# Patient Record
Sex: FEMALE | Race: WHITE | Employment: OTHER | URBAN - METROPOLITAN AREA
[De-identification: names, ages, dates, MRNs, and addresses within clinical notes are randomized per-mention and may not be internally consistent; named-entity substitution may affect disease eponyms.]

---

## 2017-10-20 NOTE — PATIENT DISCUSSION
Recommended artificial tears to use: 1 drop 8x a day in lefteyes. Refresh Optive. Retaine sample given.

## 2017-11-17 NOTE — PATIENT DISCUSSION
Discussed treatment options, including: laser, PPV, and observation. Recommend PPV. Discussed at length risks and benefits of vitrectomy.

## 2018-05-08 ENCOUNTER — OFFICE VISIT (OUTPATIENT)
Dept: PAIN MANAGEMENT | Age: 63
End: 2018-05-08

## 2018-05-08 VITALS
SYSTOLIC BLOOD PRESSURE: 127 MMHG | DIASTOLIC BLOOD PRESSURE: 80 MMHG | RESPIRATION RATE: 14 BRPM | HEIGHT: 64 IN | BODY MASS INDEX: 22.2 KG/M2 | TEMPERATURE: 98.8 F | HEART RATE: 84 BPM | WEIGHT: 130 LBS

## 2018-05-08 DIAGNOSIS — M25.572 CHRONIC PAIN OF LEFT ANKLE: Primary | ICD-10-CM

## 2018-05-08 DIAGNOSIS — G89.29 CHRONIC PAIN OF LEFT ANKLE: Primary | ICD-10-CM

## 2018-05-08 DIAGNOSIS — G89.4 CHRONIC PAIN SYNDROME: ICD-10-CM

## 2018-05-08 DIAGNOSIS — M79.672 LEFT FOOT PAIN: ICD-10-CM

## 2018-05-08 DIAGNOSIS — M19.072 PRIMARY OSTEOARTHRITIS OF LEFT ANKLE: ICD-10-CM

## 2018-05-08 RX ORDER — PSEUDOEPHEDRINE HCL 30 MG
TABLET ORAL
COMMUNITY
End: 2020-03-09 | Stop reason: CLARIF

## 2018-05-08 RX ORDER — ZINC GLUCONATE 50 MG
1000 TABLET ORAL DAILY
COMMUNITY
End: 2020-03-09 | Stop reason: CLARIF

## 2018-05-08 RX ORDER — TRAMADOL HYDROCHLORIDE 50 MG/1
50 TABLET ORAL
COMMUNITY
End: 2020-03-09 | Stop reason: CLARIF

## 2018-05-08 RX ORDER — HYDROCODONE BITARTRATE AND ACETAMINOPHEN 7.5; 325 MG/1; MG/1
TABLET ORAL
COMMUNITY
End: 2020-03-09 | Stop reason: CLARIF

## 2018-05-08 RX ORDER — VENLAFAXINE HYDROCHLORIDE 75 MG/1
CAPSULE, EXTENDED RELEASE ORAL DAILY
COMMUNITY
End: 2020-03-09 | Stop reason: CLARIF

## 2018-05-08 NOTE — PROGRESS NOTES
The pt is here today to discuss procedure options with Dr. Jennifer Washburn. Pt rates pain as 8/10.

## 2018-05-09 PROBLEM — G89.29 CHRONIC PAIN OF LEFT ANKLE: Status: ACTIVE | Noted: 2018-05-09

## 2018-05-09 PROBLEM — M79.672 LEFT FOOT PAIN: Status: ACTIVE | Noted: 2018-05-09

## 2018-05-09 PROBLEM — M19.072 PRIMARY OSTEOARTHRITIS OF LEFT ANKLE: Status: ACTIVE | Noted: 2018-05-09

## 2018-05-09 PROBLEM — M25.572 CHRONIC PAIN OF LEFT ANKLE: Status: ACTIVE | Noted: 2018-05-09

## 2018-05-09 PROBLEM — G89.4 CHRONIC PAIN SYNDROME: Status: ACTIVE | Noted: 2018-05-09

## 2018-05-09 NOTE — PROGRESS NOTES
[address] for Pain Management  Interventional Pain Management Consultation History & Physical    PATIENT NAME:  Ti Pantoja     YOB: 1955    DATE OF SERVICE:   5/8/2018      CHIEF COMPLAINT:  Foot Pain (left)      REASON FOR VISIT:   Ti Pantoja presents to the pain clinic today for initial evaluation and to consider interventional pain management options as indicated for the type and location of the pain the patient is presenting with. HISTORY OF PRESENT ILLNESS:   Patient presents for initial evaluation and consideration for interventional procedures as indicated. She is referred to us by Dr. Kimani Rivera for assistance with management of the patient's left foot and left ankle pain. At today's evaluation patient endorses left foot, left ankle pain for 3 years duration. She denies any specific antecedent trauma injury or accident. She endorses insidious onset to her left foot and left ankle pain. Approximately 2 years ago she underwent surgical procedure of the left ankle. She states that this helped her somewhat but she is still an excruciating left foot and left ankle pain. She endorses aching shooting sharp pain of her left foot and left ankle especially with ambulation. She is tried different medications with little benefit. Medications include opioid and non-opioid management preparations. She is tried neuropathic agents as well as nonsteroidals, with little benefit. She wears a orthotic device to help immobilize and stabilize her left foot. She endorses numbness dorsal aspect of her left foot down as far as the toes. She had some sort of injection distal aspect of her left foot with some benefit for a while     By review of available medical records, progress note dated March 1, 2018 by Dr. Francie Sharma is reviewed. Patient having pain up to 10/10 in intensity.   She had been terminated from pain management for violation of the opioid agreement. She apparently tested positive on urinalysis for medication oxycodone, apparently she has not been prescribed this. She is given 40 tablets of hydrocodone with no refills. She was referred to pain management specialist.  As above, bridge bolus of hydrocodone was provided. It appears as though patient will need a triple arthrodesis. She needs to stop smoking before her surgeons will consider this procedure. Custom ankle-foot orthosis is provided via clinic evaluation April 20, 2017. MRI left ankle is obtained, October 22, 2014. Moderate distal Achilles tendinosis. 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 Moderate degenerative changes subchondral edema. Diffuse marrow edema and cystic change in the anterior medial talus. ASSESSMENT/OPTIONS: as follows. We discussed options. Patient has very long-standing history of chronic left ankle pain. She has had one previous surgical procedure that apparently did not help with her pain and left ankle issues. She has been reevaluated by Dr. Heaven Nolan, and the feeling is that she needs a triple arthrodesis. Surgery is postponed until patient has stopped smoking. I believe I can help patient with intra-articular ultrasound-guided injection local anesthetic and steroid. This would of course very likely be a temporizing measure, until the patient gets definitive surgery. I have discussed the risks and benefits, indications, contraindications, and side effects of intended procedure with the patient. I have used skeleton spine model to describe and discuss the procedure with the patient. I have answered all questions relating to the procedure. Patient understands the nature of the procedure and wishes to proceed. Patient has no further questions. MRI Results (most recent):    Results from [place] encounter on 10/22/14   MRI ANKLE LT WO CONT   Narrative MRI  left ankle.     HISTORY: Pain and swelling. TECHNIQUE: MRI of the ankle was obtained without the administration of IV  contrast utilizing sagittal, coronal and axial T1 and T2 fat-saturated and  sagittal STIR sequences. COMPARISON: None. Findings:  Distal Achilles tendon is moderately thickened and increased in signal with soft  tissue edema posteriorly, likely representing tendinosis. No tear is identified. Trace retrocalcaneal bursal fluid. Diffuse marrow edema and cystic change in the plantar aspect of the medial talus  near the sinus Tarsi small amount of subchondral cystic change and edema at the  floor of the sinus Tarsi. Moderate degenerative changes at the talonavicular  joint with mild surrounding subchondral edema image 10 sagittal T1 suggestive of  fibrous coalition. Mild Degenerative changes of the second through fourth  tarsometatarsal joints    The flexor tendons, include the posterior tibialis, flexor digitorum longus, and  flexor hallucis longus are unremarkable. No tenosynovitis, tendinosis, or tear. Peroneus longus and brevis tendons are unremarkable. Extensor tendons, to include the tibialis anterior, extensor hallucis longus,  and extensor digitorum longus are normal.    The medial ankle ligament complex (deltoid) is intact, including the tibiotalar  component, the tibiocalcaneal component, and the spring ligament. The lateral ankle ligaments are intact, to include the anterior and posterior  tibiofibular ligaments, the anterior and posterior talofibular ligaments, and  the calcaneofibular ligament. Sinus tarsi demonstrates normal signal intensity. Mild edema around the calcaneal insertion of the central band of the plantar  fascia, may represent mild plantar fasciitis. Central band may be slightly  increased in signal, is not thickened. The tarsal tunnel appears normal.  Soft tissue edema around the ankle           Impression IMPRESSION:    Moderate distal Achilles tendinosis.     Fibrous coalition of the calcaneonavicular joint. Moderate degenerative changes  and subchondral edema are noted at the articulation. Diffuse marrow edema and  cystic change in the anterior medial talus may also be related. Thank you for this referral.              PAST MEDICAL HISTORY:   The patient  has no past medical history on file. PAST SURGICAL HISTORY:   The patient  has no past surgical history on file. CURRENT MEDICATIONS:   The patient has a current medication list which includes the following prescription(s): venlafaxine-sr, hydrocodone-acetaminophen, tramadol, cetirizine, cyanocobalamin, and pseudoephedrine. ALLERGIES:   Allergies not on file    FAMILY HISTORY:   The patient family history is not on file. SOCIAL HISTORY:   The patient  reports that she has been smoking. She does not have any smokeless tobacco history on file. The patient  has no alcohol history on file. She      REVIEW OF SYSTEMS:    The patient denies fever, chills, weight loss (Constitutional), rash, itching (Skin), tinnitus, congestion (HENT), blurred vision, photophobia (Eyes), palpitations, orthopnea (Cardiovascular), hemoptysis, wheezing (Respiratory), nausea, vomiting, diarrhea (Gastrointestinal), dysuria, hematuria, urgency (Genitourinary), bowel or bladder incontinence, loss of consciousness (Neurologic), suicidal or homicidal ideation or hallucinations (Psychiatric). Denies swelling, axillary or groin masses (Lymphatic). PHYSICAL EXAM:  VS:   Visit Vitals    /80 (BP 1 Location: Left arm, BP Patient Position: Sitting)    Pulse 84    Temp 98.8 °F (37.1 °C) (Oral)    Resp 14    Ht 5' 4\" (1.626 m)    Wt 59 kg (130 lb)    BMI 22.31 kg/m2     General: Well-developed and well-nourished. Body habitus consistent with recorded height and weight and the calculated BMI. Apparent distress due to left ankle pain. Head: Normocephalic, atraumatic.   Skin: Inspection of the skin reveals no rashes, lesions or infection. CV: Regular rate. No murmurs or rubs noted. No peripheral edema noted. Pulm: Respirations are even and unlabored. Extr: No clubbing, cyanosis, or edema noted. Musculoskeletal:  1. Cervical spine  Full ROM. No paraspinous tenderness at any level. There is no scoliosis, asymmetry, or musculoskeletal defect. 2. Thoracic spine  Full ROM. No paraspinous tenderness at any level. There is no scoliosis, asymmetry, or musculoskeletal defect. 3. Lumbar spine  Full ROM. No paraspinous tenderness at any level. SI joints are nontender bilaterally. There is no scoliosis, asymmetry, or musculoskeletal defect. 4. Right upper extremity  Full ROM. 5/5 muscle strength in all muscle groups. No pain or tenderness in shoulder, elbow, wrist, or hand. 5. Left upper extremity  Full ROM. 5/5 muscle strength in all muscle groups. No pain or tenderness in shoulder, elbow, wrist, or hand. 6. Right lower extremity  Full ROM. 5/5 muscle strength in all muscle groups. No pain, tenderness, or swelling in the hip, knee, ankle or foot. 7. Left lower extremity  Full ROM. 5/5 muscle strength in all muscle groups. Pain, tenderness palpation, crepitus, mild effusion noted at the distal fibular tubular/ankle joint. No induration, erythema, exudate otherwise noted skin otherwise warm and dry, pulses intact. Mildly hyperalgesic proximal ankle and dorsal foot. Gait on the left foot with weightbearing is guarded. Good capillary refill. Neurological:  1. Mental Status - Alert, awake and oriented. Speech is clear and appropriate. 2. Cranial Nerves - Extraocular muscles intact bilaterally. Cranial nerves II-XII grossly intact bilaterally. 3. Gait - antalgic   4. Reflexes - 2+ and symmetric throughout. 5. Sensation - Intact to light touch and pin prick. 6. Provocative Tests - Spurlings negative bilaterally. Straight leg raise negative bilaterally. Psychological:  1.  Mood and affect  Appropriate. 2. Speech  Appropriate. 3. Though content  Appropriate. 4. Judgment  Appropriate. ASSESSMENT:      ICD-10-CM ICD-9-CM    1. Chronic pain of left ankle M25.572 719.47     G89.29 338.29    2. Left foot pain M79.672 729.5    3. Primary osteoarthritis of left ankle M19.072 715.17    4. Chronic pain syndrome G89.4 338.4            PLAN:    1.    I have thoroughly discussed the risks and benefits, indications, contraindications, and side effects of any and procedures that were mentioned at today's patient visit. I have used a skeleton model to explain all procedures, as well as to provide added emphasis regarding procedures and as well for patient education purposes. I have answered all questions in great detail, and I have obtained verbal confirmation for all procedures planned with the patient. 3.    I have reviewed in great detail today, when indicated, the patient's MRI and other imaging studies with the patient. I have explained to the patient, when indicated, their condition using both actual recent and relevant images insofar as I am able to obtain actual images. I have used a skeleton model for added emphasis as well as patient education. 4.    I have advised patient to have a primary care provider continue to care for their health maintenance and general medical conditions. 5,    I have placed appropriate referrals to specialty care providers as I have deemed necessary through today's clinical consultation with the patient. 5.    I have explained to the patient that if any significant side effects, issues, problems, concerns, or perceived complications may arise at around the time of the patient's procedures, they should either call the pain management clinic or go to the emergency room immediately for medical provider evaluation.    6.   I have encouraged all patients to call the pain management clinic with any questions or concerns that they may have pertaining to their procedures. DISPOSITION:   The patients condition and plan were discussed at length and all questions were answered. The patient agrees with the plan. A total of 45 minutes was spent with the patient of which over half of the time was spent counseling the patient. Brooklyn Zaman MD 5/9/2018 1:04 PM    Note: Although these clinic notes were documented by the provider at the time of the exam, they have not been proofed and are subject to transcription variance.

## 2018-05-14 RX ORDER — SODIUM CHLORIDE 0.9 % (FLUSH) 0.9 %
5-10 SYRINGE (ML) INJECTION AS NEEDED
Status: CANCELLED | OUTPATIENT
Start: 2018-05-14

## 2018-05-14 RX ORDER — DIAZEPAM 5 MG/1
10 TABLET ORAL ONCE
Status: CANCELLED | OUTPATIENT
Start: 2018-05-16 | End: 2018-05-16

## 2018-05-16 ENCOUNTER — HOSPITAL ENCOUNTER (OUTPATIENT)
Age: 63
Setting detail: OUTPATIENT SURGERY
Discharge: HOME | End: 2018-05-16
Attending: PHYSICAL MEDICINE & REHABILITATION | Admitting: PHYSICAL MEDICINE & REHABILITATION
Payer: COMMERCIAL

## 2018-05-16 ENCOUNTER — APPOINTMENT (OUTPATIENT)
Dept: GENERAL RADIOLOGY | Age: 63
End: 2018-05-16
Attending: PHYSICAL MEDICINE & REHABILITATION
Payer: COMMERCIAL

## 2018-05-16 VITALS
SYSTOLIC BLOOD PRESSURE: 113 MMHG | HEART RATE: 77 BPM | OXYGEN SATURATION: 97 % | DIASTOLIC BLOOD PRESSURE: 50 MMHG | RESPIRATION RATE: 20 BRPM | TEMPERATURE: 98.9 F | HEIGHT: 64 IN | BODY MASS INDEX: 22.2 KG/M2 | WEIGHT: 130 LBS

## 2018-05-16 PROCEDURE — 77030003666 HC NDL SPINAL BD -A: Performed by: PHYSICAL MEDICINE & REHABILITATION

## 2018-05-16 PROCEDURE — 74011250636 HC RX REV CODE- 250/636: Performed by: PHYSICAL MEDICINE & REHABILITATION

## 2018-05-16 PROCEDURE — 74011000250 HC RX REV CODE- 250

## 2018-05-16 PROCEDURE — 76010000009 HC PAIN MGT 0 TO 30 MIN PROC: Performed by: PHYSICAL MEDICINE & REHABILITATION

## 2018-05-16 PROCEDURE — 74011250637 HC RX REV CODE- 250/637: Performed by: PHYSICAL MEDICINE & REHABILITATION

## 2018-05-16 PROCEDURE — 74011250636 HC RX REV CODE- 250/636

## 2018-05-16 RX ORDER — TRIAMCINOLONE ACETONIDE 40 MG/ML
INJECTION, SUSPENSION INTRA-ARTICULAR; INTRAMUSCULAR AS NEEDED
Status: DISCONTINUED | OUTPATIENT
Start: 2018-05-16 | End: 2018-05-16 | Stop reason: HOSPADM

## 2018-05-16 RX ORDER — DIAZEPAM 5 MG/1
10 TABLET ORAL ONCE
Status: DISCONTINUED | OUTPATIENT
Start: 2018-05-16 | End: 2018-05-16

## 2018-05-16 RX ORDER — DIAZEPAM 5 MG/1
5 TABLET ORAL ONCE
Status: COMPLETED | OUTPATIENT
Start: 2018-05-16 | End: 2018-05-16

## 2018-05-16 RX ORDER — SODIUM CHLORIDE 0.9 % (FLUSH) 0.9 %
5-10 SYRINGE (ML) INJECTION AS NEEDED
Status: DISCONTINUED | OUTPATIENT
Start: 2018-05-16 | End: 2018-05-16 | Stop reason: HOSPADM

## 2018-05-16 RX ORDER — LIDOCAINE HYDROCHLORIDE 10 MG/ML
INJECTION, SOLUTION EPIDURAL; INFILTRATION; INTRACAUDAL; PERINEURAL AS NEEDED
Status: DISCONTINUED | OUTPATIENT
Start: 2018-05-16 | End: 2018-05-16 | Stop reason: HOSPADM

## 2018-05-16 RX ADMIN — DIAZEPAM 5 MG: 5 TABLET ORAL at 08:50

## 2018-05-16 NOTE — PROCEDURES
THE [facility] FOR PAIN MANAGEMENT     INTRA ARTICULAR ANKLE INJECTION  PROCEDURE REPORT      PATIENT:  Troy Aguilar  YOB: 1955  DATE OF SERVICE:  5/16/2018  [facility]    PRE-PROCEDURE DIAGNOSIS:  See Above    POST-PROCEDURE DIAGNOSIS:  See Above                PROCEDURE:    1. Left intra-articular ankle injection (20605)  2. Fluoroscopic needle guidance (non-spinal) (13270)    ANESTHESIA:   Local with oral sedation. See Medication Administration Record for specific medications and dosage. COMPLICATIONS: None. PHYSICIAN:  Onel Villa MD    PRE-PROCEDURE NOTE:  Pre-procedural assessment of the patient was performed including a limited history and physical examination. The details of the procedure were discussed with the patient, including the risks, benefits and alternative options and an informed consent was obtained. The patients NPO status, if necessary for the specific procedure and/or administration of moderate intravenous sedation, if utilized, and availability of a responsible adult to escort the patient following the procedure were confirmed. PROCEDURE NOTE:  The patient was brought to the procedure suite and positioned on the fluoroscopy table in the supine position. Physiologic monitors were applied and supplemental oxygen was administered via nasal cannula. The skin was prepped in the standard surgical fashion and sterile drapes were applied over the procedure site. Please refer to the Flowsheet for documentation of the patients vital signs and the Medication Administration Record for any oral and/or intravenous sedation administered prior to or during the procedure. 1% lidocaine was utilized for local anesthesia. Under AP fluoroscopic guidance, the distal joint of the left ankle was identified.   A 1.5-inch, 25-gauge short bevel spinal needle was advanced perpendicular to the skin into the distal ankle joint complex. After negative aspiration of blood, 1-2 ml of a nonionic, water soluble, radiographic contrast medium (Isovue-M 200) was injected demonstrating appropriate proximal spread of contrast within the joint capsule. Following this, 10 ml of solution comprised of 2ml of triamcinolone (40 mg/ml) and 8ml of lidocaine 1% was injected slowly after negative aspiration of blood. The needle was cleared of steroid solution and removed. The area was thoroughly cleaned and sterile bandages applied as necessary. The patient tolerated the procedure well and vital signs remained stable throughout the procedure. POST-PROCEDURE COURSE:   The patient was escorted from the procedure suite in satisfactory condition and recovered per facility protocol based on the type of procedure performed and/or the sedation utilized. The patient did not experience any adverse events and remained hemodynamically stable during the post-procedure period. DISCHARGE NOTE:  Upon discharge, the patient was able to tolerate fluids and was in no acute distress. The patient was oriented to person, place and time and vital signs were stable. Appropriate post-procedure instructions were provided and explained to the patient in detail and all questions were answered.     Aparna Bowen MD 5/16/2018 5:52 PM

## 2018-05-16 NOTE — DISCHARGE INSTRUCTIONS
[address] [place] [address] for Pain Management      Post Procedures Instructions    *Resume Diet and Activity as tolerated. Rest for the remainder of the day. *You may fell worse before you feel better as the numbing medications wear off before the steroids take effect if used for your procedures. *Do not use affected extremity until numbness or loss of sensation has completely resolved without assistance. *DO NOT DRIVE, operate machinery/heavey equipment for 24 hours. *DO NOT DRINK ALCOHOL for 24 hours as it may interact with the sedation if you received it and also thins your blood and may cause you to bleed. *WAIT 24 hours before starting back ANY Blood thinning medications:   (Heparin, Coumadin, Warfarin, Lovenox, Plavix, Aggrenox)    *Resume Pre-Procedure Medications as prescribed except Blood Thinners unless directed by your Physician or Cardiologist.     *Avoid Hot tubs and Heating pad for 24 hours to prevent dissipation of medications, you may shower to remove bandages and remaining prep residue on the skin. * If you develop a Headache, drink plenty of fluids including beverages with caffeine (Coffee, Mt. Dew etc.) and rest.  If the headache persists longer than 24 hoursor intensifies - Please call [facility]) [phone]    * If you are DIABETIC, check your blood sugar three times a day for the next three days, the steroids will increase your blood sugar. If your blood sugar is greater than 400 have someone drive you to the nearest [address]. * If you experience any of the following problems, call the [facility] [phone] between 8:00 am - 4:30pm or After Hours [phone].     Shortness of breath    Fever of 101 F or higher    Nausea / Vomiting (not normal to you)    Increasing stiffness in the neck    Weakness or numbness in the arms or legs that is not resolving    Prolonged and increasing pain > than 4 days    ANYTHING OUT of the ORDINARY TO YOU    If YOU are experiencing a severe reaction / complication that you have never had before post procedure, call 911 or go to the nearest emergency room! All patients must have a  for transportation [place] regardless if you do or do not receive sedation. DISCHARGE SUMMARY from Nurse      PATIENT INSTRUCTIONS:    After Oral  or intravenous sedation, for 24 hours or while taking prescription Narcotics:  · Limit your activities  · Do not drive and operate hazardous machinery  · Do not make important personal or business decisions  · Do  not drink alcoholic beverages  · If you have not urinated within 8 hours after discharge, please contact your surgeon on call. Report the following to your surgeon:  · Excessive pain, swelling, redness or odor of or around the surgical area  · Temperature over 101  · Nausea and vomiting lasting longer than 4 hours or if unable to take medications  · Any signs of decreased circulation or nerve impairment to extremity: change in color, persistent  numbness, tingling, coldness or increase pain  · Any questions        What to do at Home:  Recommended activity: Activity as tolerated, NO DRIVING FOR 24 Hours post injection          *  Please give a list of your current medications to your Primary Care Provider. *  Please update this list whenever your medications are discontinued, doses are      changed, or new medications (including over-the-counter products) are added. *  Please carry medication information at all times in case of emergency situations. These are general instructions for a healthy lifestyle:    No smoking/ No tobacco products/ Avoid exposure to second hand smoke    Surgeon General's Warning:  Quitting smoking now greatly reduces serious risk to your health.     Obesity, smoking, and sedentary lifestyle greatly increases your risk for illness    A healthy diet, regular physical exercise & weight monitoring are important for maintaining a healthy lifestyle    You may be retaining fluid if you have a history of heart failure or if you experience any of the following symptoms:  Weight gain of 3 pounds or more overnight or 5 pounds in a week, increased swelling in our hands or feet or shortness of breath while lying flat in bed. Please call your doctor as soon as you notice any of these symptoms; do not wait until your next office visit. Recognize signs and symptoms of STROKE:    F-face looks uneven    A-arms unable to move or move unevenly    S-speech slurred or non-existent    T-time-call 911 as soon as signs and symptoms begin-DO NOT go       Back to bed or wait to see if you get better-TIME IS BRAIN.

## 2018-05-16 NOTE — INTERVAL H&P NOTE
H&P Update:  Mahad Espinoza was seen and examined. History and physical has been reviewed. The patient has been examined. There have been no significant clinical changes since the completion of the originally dated History and Physical.    [facility] for Pain Management  Brief Pre-Procedure History & Physical    PATIENT NAME:  Mahad Espinoza   YOB: 1955  DATE OF SERVICE:  5/16/2018      CHIEF COMPLAINT:  Pain    HISTORY OF PRESENT ILLNESS:  Mahad Espinoza presents today for a previously diagnosed problem contributing to some or all of this patients pain. The location and pattern of the pain has not changed substantially since the last visit in our office. No other significant medical changes have occurred in the last 30 days. PAST MEDICAL HISTORY:  The patient  has no past medical history on file. PAST SURGICAL HISTORY:  The patient  has a past surgical history that includes hx nephrectomy (Right). CURRENT MEDICATIONS:  See Medication Administration Record [facility]) in the patient's electronic record. ALLERGIES:    Allergies   Allergen Reactions    Codeine Other (comments)     palpitations    Morphine Other (comments)     Low blood pressure       FAMILY HISTORY:  The patient family history is not on file. SOCIAL HISTORY:  The patient  reports that she has been smoking. She has never used smokeless tobacco. The patient  reports that she does not drink alcohol. She  reports that she does not use illicit drugs. REVIEW OF SYSTEMS:  Mahad Espinoza denies any fever, chills, unexplained weight loss, use of antibiotics for recent infection or bleeding abnormalities. PHYSICAL EXAM:  VS:   Visit Vitals    /75 (BP 1 Location: Left arm, BP Patient Position: Sitting)    Pulse 91    Temp 98.9 °F (37.2 °C)    Resp 18    Ht 5' 4\" (1.626 m)    Wt 59 kg (130 lb)    SpO2 98%    BMI 22.31 kg/m2     Gen: Well-developed.  Body habitus consistent with recorded height and weight and the calculated BMI. No apparent distress. Head: Normocephalic, atraumatic. Skin: No obvious rashes, lesions or infection. Pulm: Respirations are even and unlabored. Psych:    Mood, affect and speech - Appropriate. ASSESSMENT:   1. Stable for left ankle  interventional pain procedure as discussed. PLAN:  Proceed with scheduled procedure.      Eliza La MD 5/16/2018 9:09 AM        Signed By: Eliza La MD     May 16, 2018 9:09 AM

## 2018-05-23 ENCOUNTER — TELEPHONE (OUTPATIENT)
Dept: PAIN MANAGEMENT | Age: 63
End: 2018-05-23

## 2018-05-23 NOTE — TELEPHONE ENCOUNTER
Ms. Janna Ardon was contacted for follow-up status post Left intra-articular ankle injection  on May 16, 2018.  She reports:    Pre-procedure numerical pain score: 7/10  Post-procedure numerical pain score immediately after: 4/10  Duration of relief post-procedure (if applicable): 1 weeks  Improvement in functional activities (if applicable): Yes  Percentage of overall improvement: 40%    COMMENTS:

## 2018-05-24 RX ORDER — DIAZEPAM 5 MG/1
10 TABLET ORAL ONCE
Status: CANCELLED | OUTPATIENT
Start: 2018-05-30 | End: 2018-05-30

## 2018-05-24 RX ORDER — SODIUM CHLORIDE 0.9 % (FLUSH) 0.9 %
5-10 SYRINGE (ML) INJECTION AS NEEDED
Status: CANCELLED | OUTPATIENT
Start: 2018-05-24

## 2018-05-30 ENCOUNTER — APPOINTMENT (OUTPATIENT)
Dept: GENERAL RADIOLOGY | Age: 63
End: 2018-05-30
Attending: PHYSICAL MEDICINE & REHABILITATION
Payer: COMMERCIAL

## 2018-05-30 ENCOUNTER — HOSPITAL ENCOUNTER (OUTPATIENT)
Age: 63
Setting detail: OUTPATIENT SURGERY
Discharge: HOME | End: 2018-05-30
Attending: PHYSICAL MEDICINE & REHABILITATION | Admitting: PHYSICAL MEDICINE & REHABILITATION
Payer: COMMERCIAL

## 2018-05-30 VITALS
DIASTOLIC BLOOD PRESSURE: 52 MMHG | HEIGHT: 64 IN | OXYGEN SATURATION: 100 % | HEART RATE: 68 BPM | SYSTOLIC BLOOD PRESSURE: 105 MMHG | WEIGHT: 130 LBS | BODY MASS INDEX: 22.2 KG/M2 | TEMPERATURE: 98.4 F | RESPIRATION RATE: 16 BRPM

## 2018-05-30 PROCEDURE — 74011250636 HC RX REV CODE- 250/636: Performed by: PHYSICAL MEDICINE & REHABILITATION

## 2018-05-30 PROCEDURE — 74011000250 HC RX REV CODE- 250

## 2018-05-30 PROCEDURE — 77030003666 HC NDL SPINAL BD -A: Performed by: PHYSICAL MEDICINE & REHABILITATION

## 2018-05-30 PROCEDURE — 74011250636 HC RX REV CODE- 250/636

## 2018-05-30 PROCEDURE — 76010000009 HC PAIN MGT 0 TO 30 MIN PROC: Performed by: PHYSICAL MEDICINE & REHABILITATION

## 2018-05-30 PROCEDURE — 74011250637 HC RX REV CODE- 250/637: Performed by: PHYSICAL MEDICINE & REHABILITATION

## 2018-05-30 RX ORDER — LIDOCAINE HYDROCHLORIDE 10 MG/ML
INJECTION, SOLUTION EPIDURAL; INFILTRATION; INTRACAUDAL; PERINEURAL AS NEEDED
Status: DISCONTINUED | OUTPATIENT
Start: 2018-05-30 | End: 2018-05-30 | Stop reason: HOSPADM

## 2018-05-30 RX ORDER — TRAZODONE HYDROCHLORIDE 50 MG/1
TABLET ORAL
COMMUNITY

## 2018-05-30 RX ORDER — DIAZEPAM 5 MG/1
5-20 TABLET ORAL ONCE
Status: COMPLETED | OUTPATIENT
Start: 2018-05-30 | End: 2018-05-30

## 2018-05-30 RX ORDER — SODIUM CHLORIDE 0.9 % (FLUSH) 0.9 %
5-10 SYRINGE (ML) INJECTION AS NEEDED
Status: DISCONTINUED | OUTPATIENT
Start: 2018-05-30 | End: 2018-05-30 | Stop reason: HOSPADM

## 2018-05-30 RX ORDER — DIAZEPAM 5 MG/1
10 TABLET ORAL ONCE
Status: DISCONTINUED | OUTPATIENT
Start: 2018-05-30 | End: 2018-05-30

## 2018-05-30 RX ORDER — TRIAMCINOLONE ACETONIDE 40 MG/ML
INJECTION, SUSPENSION INTRA-ARTICULAR; INTRAMUSCULAR AS NEEDED
Status: DISCONTINUED | OUTPATIENT
Start: 2018-05-30 | End: 2018-05-30 | Stop reason: HOSPADM

## 2018-05-30 RX ADMIN — DIAZEPAM 5 MG: 5 TABLET ORAL at 11:21

## 2018-05-30 NOTE — INTERVAL H&P NOTE
H&P Update:  Loren Huitron was seen and examined. History and physical has been reviewed. The patient has been examined. There have been no significant clinical changes since the completion of the originally dated History and Physical.    [facility] [address] for Pain Management  Brief Pre-Procedure History & Physical    PATIENT NAME:  Loren Huitron   YOB: 1955  DATE OF SERVICE:  5/30/2018      CHIEF COMPLAINT:  Pain    HISTORY OF PRESENT ILLNESS:  Loren Huitron presents today for a previously diagnosed problem contributing to some or all of this patients pain. The location and pattern of the pain has not changed substantially since the last visit in our office. No other significant medical changes have occurred in the last 30 days. PAST MEDICAL HISTORY:  The patient  has no past medical history on file. PAST SURGICAL HISTORY:  The patient  has a past surgical history that includes hx nephrectomy (Right). CURRENT MEDICATIONS:  See Medication Administration Record [facility]) in the patient's electronic record. ALLERGIES:    Allergies   Allergen Reactions    Codeine Other (comments)     palpitations    Morphine Other (comments)     Low blood pressure       FAMILY HISTORY:  The patient family history is not on file. SOCIAL HISTORY:  The patient  reports that she has been smoking. She has never used smokeless tobacco. The patient  reports that she does not drink alcohol. She  reports that she does not use illicit drugs. REVIEW OF SYSTEMS:  Loren Huitron denies any fever, chills, unexplained weight loss, use of antibiotics for recent infection or bleeding abnormalities. PHYSICAL EXAM:  VS:   Visit Vitals    /55 (BP 1 Location: Left arm, BP Patient Position: Sitting)    Pulse 65    Temp 98.4 °F (36.9 °C)    Resp 14    Ht 5' 4\" (1.626 m)    Wt 59 kg (130 lb)    SpO2 94%    BMI 22.31 kg/m2     Gen: Well-developed.  Body habitus consistent with recorded height and weight and the calculated BMI. No apparent distress. Head: Normocephalic, atraumatic. Skin: No obvious rashes, lesions or infection. Pulm: Respirations are even and unlabored. Psych:    Mood, affect and speech  Appropriate. ASSESSMENT:   1. Stable for left IA ankle injection, ultrasound guided interventional pain procedure as discussed. PLAN:  Proceed with scheduled procedure.      Alfredo Adams MD 5/30/2018 11:30 AM        Signed By: Alfredo Adams MD     May 30, 2018 11:29 AM

## 2018-05-30 NOTE — PROCEDURES
THE [facility] FOR PAIN MANAGEMENT     INTRA ARTICULAR LEFT DISTAL ANKLE JOINT COMPLEX INJECTION  PROCEDURE REPORT      PATIENT:  Nat Ingram  YOB: 1955  DATE OF SERVICE:  5/30/2018  [facility]    PRE-PROCEDURE DIAGNOSIS:  See Above    POST-PROCEDURE DIAGNOSIS:  See Above                PROCEDURE:    1. Left intra-articular left ankle joint complex injection (20605)  2. Fluoroscopic needle guidance (non-spinal) (85696)    ANESTHESIA:  Local with oral sedation. See Medication Administration Record for specific medications and dosage. COMPLICATIONS: None. PHYSICIAN:  Eboni Dash MD    PRE-PROCEDURE NOTE:  Pre-procedural assessment of the patient was performed including a limited history and physical examination. The details of the procedure were discussed with the patient, including the risks, benefits and alternative options and an informed consent was obtained. The patients NPO status, if necessary for the specific procedure and/or administration of moderate intravenous sedation, if utilized, and availability of a responsible adult to escort the patient following the procedure were confirmed. PROCEDURE NOTE:  The patient was brought to the procedure suite and positioned on the fluoroscopy table in the supine position. Physiologic monitors were applied and supplemental oxygen was administered via nasal cannula. The skin was prepped in the standard surgical fashion and sterile drapes were applied over the procedure site. Please refer to the Flowsheet for documentation of the patients vital signs and the Medication Administration Record for any oral and/or intravenous sedation administered prior to or during the procedure. 1% lidocaine was utilized for local anesthesia. Under ultrasound guidance, the left distal ankle joint complex was identified.   A 2-inch, 25-gauge short bevel spinal needle was advanced perpendicular to the skin until the distal tip passed into the left ankle joint complex. Following this, 5 ml of solution comprised of 2ml of triamcinolone (40 mg/ml) and 3ml of lidocaine 1% was injected slowly after negative aspiration of blood. The needle was cleared of steroid solution and removed. The area was thoroughly cleaned and sterile bandages applied as necessary. The patient tolerated the procedure well and vital signs remained stable throughout the procedure. POST-PROCEDURE COURSE:   The patient was escorted from the procedure suite in satisfactory condition and recovered per facility protocol based on the type of procedure performed and/or the sedation utilized. The patient did not experience any adverse events and remained hemodynamically stable during the post-procedure period. DISCHARGE NOTE:  Upon discharge, the patient was able to tolerate fluids and was in no acute distress. The patient was oriented to person, place and time and vital signs were stable. Appropriate post-procedure instructions were provided and explained to the patient in detail and all questions were answered.     Lawanda Johnson MD 5/30/2018 1:39 PM

## 2018-05-30 NOTE — PROCEDURES
THE [facility] FOR PAIN MANAGEMENT     INTRA ARTICULAR LEFT DISTAL ANKLE COMPLEX INJECTION  PROCEDURE REPORT      PATIENT:  Geovanny Cameron  YOB: 1955  DATE OF SERVICE:  5/30/2018  [facility]    PRE-PROCEDURE DIAGNOSIS:  See Above    POST-PROCEDURE DIAGNOSIS:  See Above                PROCEDURE:    1. Left intra-articular distal ankle complex injection (20606 )  2. Ultrasound guidance (non-spinal) (11313)    ANESTHESIA:  Local with oral sedation. See Medication Administration Record for specific medications and dosage. COMPLICATIONS: None. PHYSICIAN:  Chema Monroe MD    PRE-PROCEDURE NOTE:  Pre-procedural assessment of the patient was performed including a limited history and physical examination. The details of the procedure were discussed with the patient, including the risks, benefits and alternative options and an informed consent was obtained. The patients NPO status, if necessary for the specific procedure and/or administration of moderate intravenous sedation, if utilized, and availability of a responsible adult to escort the patient following the procedure were confirmed. PROCEDURE NOTE:  The patient was brought to the procedure suite and positioned on the fluoroscopy table in the supine position. Physiologic monitors were applied and supplemental oxygen was administered via nasal cannula. The skin was prepped in the standard surgical fashion and sterile drapes were applied over the procedure site. Please refer to the Flowsheet for documentation of the patients vital signs and the Medication Administration Record for any oral and/or intravenous sedation administered prior to or during the procedure. 1% lidocaine was utilized for local anesthesia. Under ultrasound guidance, the distal ankle joint complex was identified.   A 2-inch, 25-gauge short bevel spinal needle was advanced perpendicular to the skin until the tip of the needle entered the distal ankle joint complex. Following this, 5 ml of solution comprised of ml of triamcinolone (40 mg/ml) and 3ml of lidocaine 1% was injected slowly after negative aspiration of blood. The needle was cleared of steroid solution and removed. The area was thoroughly cleaned and sterile bandages applied as necessary. The patient tolerated the procedure well and vital signs remained stable throughout the procedure. POST-PROCEDURE COURSE:   The patient was escorted from the procedure suite in satisfactory condition and recovered per facility protocol based on the type of procedure performed and/or the sedation utilized. The patient did not experience any adverse events and remained hemodynamically stable during the post-procedure period. DISCHARGE NOTE:  Upon discharge, the patient was able to tolerate fluids and was in no acute distress. The patient was oriented to person, place and time and vital signs were stable. Appropriate post-procedure instructions were provided and explained to the patient in detail and all questions were answered.     Ivone Lux MD 5/30/2018 5:19 PM

## 2018-05-30 NOTE — DISCHARGE INSTRUCTIONS
[address] [place] [address] for Pain Management      Post Procedures Instructions    *Resume Diet and Activity as tolerated. Rest for the remainder of the day. *You may fell worse before you feel better as the numbing medications wear off before the steroids take effect if used for your procedures. *Do not use affected extremity until numbness or loss of sensation has completely resolved without assistance. *DO NOT DRIVE, operate machinery/heavey equipment for 24 hours. *DO NOT DRINK ALCOHOL for 24 hours as it may interact with the sedation if you received it and also thins your blood and may cause you to bleed. *WAIT 24 hours before starting back ANY Blood thinning medications:   (Heparin, Coumadin, Warfarin, Lovenox, Plavix, Aggrenox)    *Resume Pre-Procedure Medications as prescribed except Blood Thinners unless directed by your Physician or Cardiologist.     *Avoid Hot tubs and Heating pad for 24 hours to prevent dissipation of medications, you may shower to remove bandages and remaining prep residue on the skin. * If you develop a Headache, drink plenty of fluids including beverages with caffeine (Coffee, Mt. Dew etc.) and rest.  If the headache persists longer than 24 hoursor intensifies - Please call [facility] ([facility]) [phone]    * If you are DIABETIC, check your blood sugar three times a day for the next three days, the steroids will increase your blood sugar. If your blood sugar is greater than 400 have someone drive you to the nearest [address]. * If you experience any of the following problems, call the Center for Pain Management [phone] between 8:00 am - 4:30pm or After Hours [phone].     Shortness of breath    Fever of 101 F or higher    Nausea / Vomiting (not normal to you)    Increasing stiffness in the neck    Weakness or numbness in the arms or legs that is not resolving    Prolonged and increasing pain > than 4 days    ANYTHING OUT of the ORDINARY TO YOU    If YOU are experiencing a severe reaction / complication that you have never had before post procedure, call 911 or go to the nearest emergency room! All patients must have a  for transportation [place] regardless if you do or do not receive sedation. DISCHARGE SUMMARY from Nurse      PATIENT INSTRUCTIONS:    After Oral  or intravenous sedation, for 24 hours or while taking prescription Narcotics:  · Limit your activities  · Do not drive and operate hazardous machinery  · Do not make important personal or business decisions  · Do  not drink alcoholic beverages  · If you have not urinated within 8 hours after discharge, please contact your surgeon on call. Report the following to your surgeon:  · Excessive pain, swelling, redness or odor of or around the surgical area  · Temperature over 101  · Nausea and vomiting lasting longer than 4 hours or if unable to take medications  · Any signs of decreased circulation or nerve impairment to extremity: change in color, persistent  numbness, tingling, coldness or increase pain  · Any questions        What to do at Home:  Recommended activity: Activity as tolerated, NO DRIVING FOR 24 Hours post injection          *  Please give a list of your current medications to your Primary Care Provider. *  Please update this list whenever your medications are discontinued, doses are      changed, or new medications (including over-the-counter products) are added. *  Please carry medication information at all times in case of emergency situations. These are general instructions for a healthy lifestyle:    No smoking/ No tobacco products/ Avoid exposure to second hand smoke    Surgeon General's Warning:  Quitting smoking now greatly reduces serious risk to your health.     Obesity, smoking, and sedentary lifestyle greatly increases your risk for illness    A healthy diet, regular physical exercise & weight monitoring are important for maintaining a healthy lifestyle    You may be retaining fluid if you have a history of heart failure or if you experience any of the following symptoms:  Weight gain of 3 pounds or more overnight or 5 pounds in a week, increased swelling in our hands or feet or shortness of breath while lying flat in bed. Please call your doctor as soon as you notice any of these symptoms; do not wait until your next office visit. Recognize signs and symptoms of STROKE:    F-face looks uneven    A-arms unable to move or move unevenly    S-speech slurred or non-existent    T-time-call 911 as soon as signs and symptoms begin-DO NOT go       Back to bed or wait to see if you get better-TIME IS BRAIN.

## 2018-06-07 ENCOUNTER — TELEPHONE (OUTPATIENT)
Dept: PAIN MANAGEMENT | Age: 63
End: 2018-06-07

## 2018-06-07 NOTE — TELEPHONE ENCOUNTER
Ms. Elizabeth Varghese was contacted for follow-up status post Left intra-articular distal ankle complex injection on May 30, 2018.  She reports:    Pre-procedure numerical pain score: 4/10  Post-procedure numerical pain score immediately after: 10/10  Duration of relief post-procedure (if applicable): 0 WCOC/GSX/LWW/JPM:[id number]}  Improvement in functional activities (if applicable): No  Percentage of overall improvement: 0%    COMMENTS:

## 2019-03-05 NOTE — PATIENT DISCUSSION
Now that patient has been seen and settled on no vitrectomy with Dr Clive Gallo he would like to pursue LVC OS>OD.

## 2019-04-22 NOTE — PATIENT DISCUSSION
4/22/19 Per [facility] no charge for Epi-Lasek with [facility] OD Goal: Andra SMALLWOOD.

## 2019-04-22 NOTE — PATIENT DISCUSSION
Now that patient has been seen and settled on no vitrectomy with Dr Richard Kerr he would like to pursue LVC OD.

## 2019-04-22 NOTE — PATIENT DISCUSSION
No Lasik recommended at this time. Pt is seeing well in the distance and in the near. [facility] showed pt that he will lose all near vision if he has Lasek in Left eye for Distance.

## 2019-04-25 NOTE — PATIENT DISCUSSION
4/22/19 Per [facility] no charge for Epi-Lasek with [facility] OD Goal: Andra SMALLWOOD.

## 2019-04-25 NOTE — PATIENT DISCUSSION
Now that patient has been seen and settled on no vitrectomy with Dr Luis Armando Grigsby he would like to pursue LVC OD.

## 2019-04-26 NOTE — PATIENT DISCUSSION
Now that patient has been seen and settled on no vitrectomy with Dr Saskia Walter he would like to pursue LVC OD.

## 2019-05-03 NOTE — PATIENT DISCUSSION
Now that patient has been seen and settled on no vitrectomy with Dr Chloé Moran he would like to pursue LVC OD.

## 2020-01-09 ENCOUNTER — IMPORTED ENCOUNTER (OUTPATIENT)
Dept: URBAN - METROPOLITAN AREA CLINIC 1 | Facility: CLINIC | Age: 65
End: 2020-01-09

## 2020-01-09 PROBLEM — H16.143: Noted: 2020-01-09

## 2020-01-09 PROBLEM — H11.153: Noted: 2020-01-09

## 2020-01-09 PROBLEM — H04.123: Noted: 2020-01-09

## 2020-01-09 PROBLEM — H25.813: Noted: 2020-01-09

## 2020-01-09 PROCEDURE — 92004 COMPRE OPH EXAM NEW PT 1/>: CPT

## 2020-01-09 PROCEDURE — 92015 DETERMINE REFRACTIVE STATE: CPT

## 2020-01-09 NOTE — PATIENT DISCUSSION
1.  Cataract OU --  Visually Significant secondary to Dense PSC OD and Glare OS discussed the risks benefits alternatives and limitations of cataract surgery. The patient stated a full understanding and a desire to proceed with the procedure. The patient will need to return for preop appointment with cataract measurements and to have any additional questions answered and start pre-operative eye drops as directed. *PMG did not see today. Phaco PCL OD then OSOtherwise follow-up2. CARLITA w/ PEK OU -- The use/continuation of artificial tears were recommended. 3.  Pinguecula OU -- Use of sunglasses when exposed to UV light and observation is recommended. Return for an appointment in 10/Ascan/HP with Dr. Raj Bills.

## 2020-02-27 ENCOUNTER — IMPORTED ENCOUNTER (OUTPATIENT)
Dept: URBAN - METROPOLITAN AREA CLINIC 1 | Facility: CLINIC | Age: 65
End: 2020-02-27

## 2020-02-27 PROBLEM — H04.123: Noted: 2020-02-27

## 2020-02-27 PROBLEM — H25.813: Noted: 2020-02-27

## 2020-02-27 PROBLEM — H16.143: Noted: 2020-02-27

## 2020-02-27 PROCEDURE — 92012 INTRM OPH EXAM EST PATIENT: CPT

## 2020-02-27 PROCEDURE — 92136 OPHTHALMIC BIOMETRY: CPT

## 2020-02-27 NOTE — PATIENT DISCUSSION
1.  Cataract OU --  Visually Significant discussed the risks benefits alternatives and limitations of cataract surgery. The patient stated a full understanding and a desire to proceed with the procedure. Discussed with patient if PO Gtts are more than $120 for all three combined when filling at their Pharmacy please call our office to request generic substitutions. Pt came to pre-op appointment today alone. Lifestyle Questionnaire Completed. Pt understands they will need glasses post-op to achieve their best corrected vision. *Guarded Prognosis secondary to poor contrast/view through CataractPhaco PCL OD then OS 2. CARLITA w/ PEK OU -- The use/continuation of artificial tears were recommended. 3.  Pinguecula OU -- Use of sunglasses when exposed to UV light and observation is recommended. Return for an appointment in f/u as scheduled with Dr. Alyx Woodson.

## 2020-03-11 ENCOUNTER — IMPORTED ENCOUNTER (OUTPATIENT)
Dept: URBAN - METROPOLITAN AREA CLINIC 1 | Facility: CLINIC | Age: 65
End: 2020-03-11

## 2020-03-11 PROBLEM — H25.811 COMBINED FORM OF SENILE CATARACT OF RIGHT EYE: Status: ACTIVE | Noted: 2020-03-11

## 2020-03-11 PROBLEM — H25.811 COMBINED FORM OF SENILE CATARACT OF RIGHT EYE: Status: RESOLVED | Noted: 2020-03-11 | Resolved: 2020-03-11

## 2020-03-13 ENCOUNTER — HOSPITAL ENCOUNTER (OUTPATIENT)
Dept: CT IMAGING | Age: 65
Discharge: HOME | End: 2020-03-13
Attending: OPHTHALMOLOGY
Payer: MEDICARE

## 2020-03-13 DIAGNOSIS — C69.31 CHOROID MELANOMA OF RIGHT EYE (HCC): ICD-10-CM

## 2020-03-13 LAB — CREAT UR-MCNC: 0.9 MG/DL (ref 0.6–1.3)

## 2020-03-13 PROCEDURE — 74011636320 HC RX REV CODE- 636/320: Performed by: OPHTHALMOLOGY

## 2020-03-13 PROCEDURE — 82565 ASSAY OF CREATININE: CPT

## 2020-03-13 PROCEDURE — 74177 CT ABD & PELVIS W/CONTRAST: CPT

## 2020-03-13 RX ADMIN — IOPAMIDOL 85 ML: 612 INJECTION, SOLUTION INTRAVENOUS at 12:05

## 2020-03-19 ENCOUNTER — IMPORTED ENCOUNTER (OUTPATIENT)
Dept: URBAN - METROPOLITAN AREA CLINIC 1 | Facility: CLINIC | Age: 65
End: 2020-03-19

## 2020-03-19 PROBLEM — H25.812: Noted: 2020-03-19

## 2020-03-19 PROCEDURE — 92136 OPHTHALMIC BIOMETRY: CPT

## 2020-03-19 NOTE — PATIENT DISCUSSION
1.  Cataract OS Visually Significant secondary to glare discussed the risks benefits alternatives and limitations of cataract surgery. The patient stated a full understanding and a desire to proceed with the procedure. Discussed with patient if PO Gtts are more than $120 for all three combined when filling at their Pharmacy please call our office to request generic substitutions. Pt understands they will need glasses post-op to achieve their best corrected vision. Phaco PCL OS 2. POW#3  CE/IOL OD (Standard) doing well. **H/o Fundus Mass OD followed by Gabriella Hightower. Use Prednisolone BID OD & Prolensa Qdaily OD: Use through completion of po gtt regimen.  F/u as scheduled 2nd eye

## 2020-04-14 ENCOUNTER — HOSPITAL ENCOUNTER (OUTPATIENT)
Dept: RADIATION THERAPY | Age: 65
Discharge: HOME | End: 2020-04-14
Payer: MEDICARE

## 2020-04-16 ENCOUNTER — HOSPITAL ENCOUNTER (OUTPATIENT)
Dept: RADIATION THERAPY | Age: 65
Discharge: HOME | End: 2020-04-16
Payer: MEDICARE

## 2020-04-16 PROCEDURE — 77290 THER RAD SIMULAJ FIELD CPLX: CPT

## 2020-04-16 PROCEDURE — 77470 SPECIAL RADIATION TREATMENT: CPT

## 2020-04-17 ENCOUNTER — HOSPITAL ENCOUNTER (OUTPATIENT)
Dept: RADIATION THERAPY | Age: 65
Discharge: HOME | End: 2020-04-17
Payer: MEDICARE

## 2020-04-20 ENCOUNTER — HOSPITAL ENCOUNTER (OUTPATIENT)
Dept: RADIATION THERAPY | Age: 65
Discharge: HOME | End: 2020-04-20
Payer: MEDICARE

## 2020-04-20 PROCEDURE — 77318 BRACHYTX ISODOSE COMPLEX: CPT

## 2020-04-21 ENCOUNTER — HOSPITAL ENCOUNTER (OUTPATIENT)
Dept: RADIATION THERAPY | Age: 65
Discharge: HOME | End: 2020-04-21
Payer: MEDICARE

## 2020-04-22 ENCOUNTER — HOSPITAL ENCOUNTER (OUTPATIENT)
Dept: RADIATION THERAPY | Age: 65
Discharge: HOME | End: 2020-04-22
Payer: MEDICARE

## 2020-04-28 NOTE — PERIOP NOTES
PAT - SURGICAL PRE-ADMISSION INSTRUCTIONS    NAME:  Geraldyne Nyhan                                                          TODAY'S DATE:  4/28/2020    SURGERY DATE:  4/30/2020                                  SURGERY ARRIVAL TIME:   1230 PER OFFICE    1. Do NOT eat or drink anything, including candy or gum, after MIDNIGHT on 4/29/20 , unless you have specific instructions from your Surgeon or Anesthesia Provider to do so. 2. No smoking on the day of surgery. 3. No alcohol 24 hours prior to the day of surgery. 4. No recreational drugs for one week prior to the day of surgery. 5. Leave all valuables, including money/purse, at home. 6. Remove all jewelry, nail polish, makeup (including mascara); no lotions, powders, deodorant, or perfume/cologne/after shave. 7. Glasses/Contact lenses and Dentures may be worn to the hospital.  They will be removed prior to surgery. 8. Call your doctor if symptoms of a cold or illness develop within 24 ours prior to surgery. 9. AN ADULT MUST DRIVE YOU HOME AFTER OUTPATIENT SURGERY. 10. If you are having an OUTPATIENT procedure, please make arrangements for a responsible adult to be with you for 24 hours after your surgery. 11. If you are admitted to the hospital, you will be assigned to a bed after surgery is complete. Normally a family member will not be able to see you until you are in your assigned bed. 15. Family is encouraged to accompany you to the hospital.  We ask visitors in the treatment area to be limited to ONE person at a time to ensure patient privacy. EXCEPTIONS WILL BE MADE AS NEEDED. 15. Children under 12 are discouraged from entering the treatment area and need to be supervised by an adult when in the waiting room. Special Instructions:     Take these medications the morning of surgery with a sip of water:  MORNING MEDS    Patient Prep:    shower with anti-bacterial soap    These surgical instructions were reviewed with PATIENT during the PAT PHONE CALL.  Directions: On the morning of surgery, please go to the [facility]. Enter the building from the [facility] entrance, 1st floor (next to the Emergency Room entrance). Take the elevator to the 2nd floor. Sign in at the Registration Desk.     If you have any questions and/or concerns, please do not hesitate to call:  (Prior to the day of surgery)  [facility] unit:  [phone]  (Day of surgery)  [facility] unit:  [phone]

## 2020-04-28 NOTE — PERIOP NOTES
Called office and spoke to [facility] [place], requesting H&P; she will fax.

## 2020-04-29 ENCOUNTER — ANESTHESIA EVENT (OUTPATIENT)
Dept: SURGERY | Age: 65
End: 2020-04-29
Payer: MEDICARE

## 2020-04-30 ENCOUNTER — HOSPITAL ENCOUNTER (OUTPATIENT)
Age: 65
Setting detail: OUTPATIENT SURGERY
Discharge: HOME | End: 2020-04-30
Attending: OPHTHALMOLOGY | Admitting: OPHTHALMOLOGY
Payer: MEDICARE

## 2020-04-30 ENCOUNTER — ANESTHESIA (OUTPATIENT)
Dept: SURGERY | Age: 65
End: 2020-04-30
Payer: MEDICARE

## 2020-04-30 ENCOUNTER — HOSPITAL ENCOUNTER (OUTPATIENT)
Dept: RADIATION THERAPY | Age: 65
Discharge: HOME | End: 2020-04-30
Attending: OPHTHALMOLOGY | Admitting: OPHTHALMOLOGY
Payer: MEDICARE

## 2020-04-30 VITALS
BODY MASS INDEX: 19.21 KG/M2 | WEIGHT: 112.5 LBS | HEART RATE: 67 BPM | DIASTOLIC BLOOD PRESSURE: 67 MMHG | HEIGHT: 64 IN | SYSTOLIC BLOOD PRESSURE: 124 MMHG | TEMPERATURE: 98.8 F | RESPIRATION RATE: 14 BRPM | OXYGEN SATURATION: 96 %

## 2020-04-30 PROCEDURE — 74011250636 HC RX REV CODE- 250/636: Performed by: NURSE ANESTHETIST, CERTIFIED REGISTERED

## 2020-04-30 PROCEDURE — 77030013353: Performed by: OPHTHALMOLOGY

## 2020-04-30 PROCEDURE — 76210000020 HC REC RM PH II FIRST 0.5 HR: Performed by: OPHTHALMOLOGY

## 2020-04-30 PROCEDURE — 77030025717 HC KT PIK VIT CONSTL ALCN -F: Performed by: OPHTHALMOLOGY

## 2020-04-30 PROCEDURE — 77030003029 HC SUT VCRL J&J -B: Performed by: OPHTHALMOLOGY

## 2020-04-30 PROCEDURE — 74011000250 HC RX REV CODE- 250: Performed by: OPHTHALMOLOGY

## 2020-04-30 PROCEDURE — 77030026916 HC ERAS HEMSTAT BVTC -B: Performed by: OPHTHALMOLOGY

## 2020-04-30 PROCEDURE — 74011000272 HC RX REV CODE- 272: Performed by: OPHTHALMOLOGY

## 2020-04-30 PROCEDURE — 74011000250 HC RX REV CODE- 250: Performed by: NURSE ANESTHETIST, CERTIFIED REGISTERED

## 2020-04-30 PROCEDURE — 77030018831 HC SOL IRR H20 BAXT -A: Performed by: OPHTHALMOLOGY

## 2020-04-30 PROCEDURE — 77030002996 HC SUT SLK J&J -A: Performed by: OPHTHALMOLOGY

## 2020-04-30 PROCEDURE — 77030002916 HC SUT ETHLN J&J -A: Performed by: OPHTHALMOLOGY

## 2020-04-30 PROCEDURE — C2639 BRACHYTX, NON-STRANDED,I-125: HCPCS

## 2020-04-30 PROCEDURE — 76060000033 HC ANESTHESIA 1 TO 1.5 HR: Performed by: OPHTHALMOLOGY

## 2020-04-30 PROCEDURE — 74011250637 HC RX REV CODE- 250/637: Performed by: ANESTHESIOLOGY

## 2020-04-30 PROCEDURE — 76010000161 HC OR TIME 1 TO 1.5 HR INTENSV-TIER 1: Performed by: OPHTHALMOLOGY

## 2020-04-30 PROCEDURE — C2638 BRACHYTX, STRANDED, I-125: HCPCS

## 2020-04-30 PROCEDURE — 77789 APPLY SURF LDR RADIONUCLIDE: CPT

## 2020-04-30 PROCEDURE — 74011250636 HC RX REV CODE- 250/636: Performed by: OPHTHALMOLOGY

## 2020-04-30 RX ORDER — SODIUM CHLORIDE 0.9 % (FLUSH) 0.9 %
5-40 SYRINGE (ML) INJECTION AS NEEDED
Status: DISCONTINUED | OUTPATIENT
Start: 2020-04-30 | End: 2020-04-30 | Stop reason: HOSPADM

## 2020-04-30 RX ORDER — LIDOCAINE HYDROCHLORIDE 20 MG/ML
INJECTION, SOLUTION EPIDURAL; INFILTRATION; INTRACAUDAL; PERINEURAL AS NEEDED
Status: DISCONTINUED | OUTPATIENT
Start: 2020-04-30 | End: 2020-04-30 | Stop reason: HOSPADM

## 2020-04-30 RX ORDER — PROPOFOL 10 MG/ML
INJECTION, EMULSION INTRAVENOUS AS NEEDED
Status: DISCONTINUED | OUTPATIENT
Start: 2020-04-30 | End: 2020-04-30 | Stop reason: HOSPADM

## 2020-04-30 RX ORDER — CYCLOPENTOLATE HYDROCHLORIDE 10 MG/ML
1 SOLUTION/ DROPS OPHTHALMIC
Status: COMPLETED | OUTPATIENT
Start: 2020-04-30 | End: 2020-04-30

## 2020-04-30 RX ORDER — SODIUM CHLORIDE 0.9 % (FLUSH) 0.9 %
5-40 SYRINGE (ML) INJECTION EVERY 8 HOURS
Status: DISCONTINUED | OUTPATIENT
Start: 2020-04-30 | End: 2020-04-30 | Stop reason: HOSPADM

## 2020-04-30 RX ORDER — FAMOTIDINE 20 MG/1
20 TABLET, FILM COATED ORAL ONCE
Status: DISCONTINUED | OUTPATIENT
Start: 2020-05-01 | End: 2020-04-30

## 2020-04-30 RX ORDER — PHENYLEPHRINE HYDROCHLORIDE 25 MG/ML
1 SOLUTION/ DROPS OPHTHALMIC
Status: COMPLETED | OUTPATIENT
Start: 2020-04-30 | End: 2020-04-30

## 2020-04-30 RX ORDER — FENTANYL CITRATE 50 UG/ML
INJECTION, SOLUTION INTRAMUSCULAR; INTRAVENOUS AS NEEDED
Status: DISCONTINUED | OUTPATIENT
Start: 2020-04-30 | End: 2020-04-30 | Stop reason: HOSPADM

## 2020-04-30 RX ORDER — MIDAZOLAM HYDROCHLORIDE 1 MG/ML
INJECTION, SOLUTION INTRAMUSCULAR; INTRAVENOUS AS NEEDED
Status: DISCONTINUED | OUTPATIENT
Start: 2020-04-30 | End: 2020-04-30 | Stop reason: HOSPADM

## 2020-04-30 RX ORDER — DEXAMETHASONE SODIUM PHOSPHATE 4 MG/ML
INJECTION, SOLUTION INTRA-ARTICULAR; INTRALESIONAL; INTRAMUSCULAR; INTRAVENOUS; SOFT TISSUE AS NEEDED
Status: DISCONTINUED | OUTPATIENT
Start: 2020-04-30 | End: 2020-04-30 | Stop reason: HOSPADM

## 2020-04-30 RX ORDER — FAMOTIDINE 20 MG/1
20 TABLET, FILM COATED ORAL ONCE
Status: COMPLETED | OUTPATIENT
Start: 2020-04-30 | End: 2020-04-30

## 2020-04-30 RX ORDER — SODIUM CHLORIDE, SODIUM LACTATE, POTASSIUM CHLORIDE, CALCIUM CHLORIDE 600; 310; 30; 20 MG/100ML; MG/100ML; MG/100ML; MG/100ML
50 INJECTION, SOLUTION INTRAVENOUS CONTINUOUS
Status: DISCONTINUED | OUTPATIENT
Start: 2020-05-01 | End: 2020-04-30 | Stop reason: HOSPADM

## 2020-04-30 RX ORDER — PROPOFOL 10 MG/ML
INJECTION, EMULSION INTRAVENOUS
Status: DISCONTINUED | OUTPATIENT
Start: 2020-04-30 | End: 2020-04-30 | Stop reason: HOSPADM

## 2020-04-30 RX ORDER — GENTAMICIN SULFATE 40 MG/ML
INJECTION, SOLUTION INTRAMUSCULAR; INTRAVENOUS AS NEEDED
Status: DISCONTINUED | OUTPATIENT
Start: 2020-04-30 | End: 2020-04-30 | Stop reason: HOSPADM

## 2020-04-30 RX ADMIN — PROPOFOL 25 MCG/KG/MIN: 10 INJECTION, EMULSION INTRAVENOUS at 16:26

## 2020-04-30 RX ADMIN — PROPOFOL 100 MG: 10 INJECTION, EMULSION INTRAVENOUS at 16:10

## 2020-04-30 RX ADMIN — CYCLOPENTOLATE HYDROCHLORIDE 1 DROP: 10 SOLUTION/ DROPS OPHTHALMIC at 14:47

## 2020-04-30 RX ADMIN — SODIUM CHLORIDE, SODIUM LACTATE, POTASSIUM CHLORIDE, AND CALCIUM CHLORIDE 50 ML/HR: 600; 310; 30; 20 INJECTION, SOLUTION INTRAVENOUS at 13:44

## 2020-04-30 RX ADMIN — PHENYLEPHRINE HYDROCHLORIDE 1 DROP: 25 SOLUTION/ DROPS OPHTHALMIC at 13:44

## 2020-04-30 RX ADMIN — LIDOCAINE HYDROCHLORIDE 50 MG: 20 INJECTION, SOLUTION INTRAVENOUS at 16:10

## 2020-04-30 RX ADMIN — FENTANYL CITRATE 50 MCG: 50 INJECTION, SOLUTION INTRAMUSCULAR; INTRAVENOUS at 16:04

## 2020-04-30 RX ADMIN — MIDAZOLAM 2 MG: 1 INJECTION INTRAMUSCULAR; INTRAVENOUS at 16:04

## 2020-04-30 RX ADMIN — CYCLOPENTOLATE HYDROCHLORIDE 1 DROP: 10 SOLUTION/ DROPS OPHTHALMIC at 13:44

## 2020-04-30 RX ADMIN — PHENYLEPHRINE HYDROCHLORIDE 1 DROP: 25 SOLUTION/ DROPS OPHTHALMIC at 14:47

## 2020-04-30 RX ADMIN — CYCLOPENTOLATE HYDROCHLORIDE 1 DROP: 10 SOLUTION/ DROPS OPHTHALMIC at 13:29

## 2020-04-30 RX ADMIN — FAMOTIDINE 20 MG: 20 TABLET ORAL at 13:48

## 2020-04-30 RX ADMIN — BUPIVACAINE HYDROCHLORIDE AND EPINEPHRINE BITARTRATE: 7.5; .005 INJECTION, SOLUTION EPIDURAL; RETROBULBAR at 16:11

## 2020-04-30 RX ADMIN — PHENYLEPHRINE HYDROCHLORIDE 1 DROP: 25 SOLUTION/ DROPS OPHTHALMIC at 13:29

## 2020-04-30 RX ADMIN — FENTANYL CITRATE 50 MCG: 50 INJECTION, SOLUTION INTRAMUSCULAR; INTRAVENOUS at 16:28

## 2020-04-30 NOTE — PERIOP NOTES
Phase 2 Recovery Summary    Patient arrived to Phase 2   Report received from Cleveland Jordan RN  Vitals:    04/30/20 1241 04/30/20 1707 04/30/20 1717 04/30/20 1718   BP: 144/82 108/60  124/67   Pulse: 87 67     Resp: 18 14     Temp: 98.8 °F (37.1 °C)      SpO2: 96% 98% 96% 96%   Weight: 51 kg (112 lb 8 oz)      Height: 5' 3.5\" (1.613 m)          oriented to time, place, person and situation    Lines and Drains  Peripheral Intravenous Line:   Peripheral IV 04/30/20 Right Antecubital (Active)   Site Assessment Clean, dry, & intact 4/30/2020  5:11 PM   Phlebitis Assessment 0 4/30/2020  5:11 PM   Infiltration Assessment 0 4/30/2020  5:11 PM   Dressing Status Clean, dry, & intact 4/30/2020  5:11 PM   Dressing Type Transparent;Tape 4/30/2020  5:11 PM   Hub Color/Line Status Pink; Infusing 4/30/2020  5:11 PM       Wound  Wound Eye Right (Active)   Number of days: 50       Wound Eye Right (Active)   Dressing Status Clean, dry, and intact 4/30/2020  5:25 PM   Dressing Type Eye patch; Eye shield 4/30/2020  5:25 PM   Number of days: 0       Wound Eye Right (Active)   Number of days: 0        Patient arrived to phase 2 from operating room. Alert and oriented x4. No complaints of nausea or dizziness. VSS. patient brought back to recovery room. Patient tolerated PO fluids. Patient ambulated from bed to chair with minimal assistance. V removed and patient allowed to get dressed. Reviewed discharge instructions. patient signed for discharge. Patient transported to vehicle via wheelchair. Patient discharged to home  With Elisabet Miller. She is her ride. [address]  Pt's ride arrived.

## 2020-04-30 NOTE — PERIOP NOTES
Pre-Op Summary    Pt arrived via car with family/friend and is oriented to time, place, person and situation. Patient with steady gait with none assistive devices. Visit Vitals  /82 (BP 1 Location: Left arm, BP Patient Position: Sitting)   Pulse 87   Temp 98.8 °F (37.1 °C)   Resp 18   Ht 5' 3.5\" (1.613 m)   Wt 51 kg (112 lb 8 oz)   SpO2 96%   BMI 19.62 kg/m²       Peripheral IV located on Right antecubital .    Patients belongings are locked up on [facility]. Patient's point of contact is Hayes Long and their contact number is: [phone]. They will be leaving and coming back. They are able to receive medication information. They will be their ride home.

## 2020-04-30 NOTE — ANESTHESIA POSTPROCEDURE EVALUATION
Procedure(s):  Right eye destruction of localized lesion of retina,  BRACHYTHERAPY WITH EYE PLAQUE-IODINE 125 SEEDS FOR 96 HOURS. general, MAC    Anesthesia Post Evaluation      Multimodal analgesia: multimodal analgesia used between 6 hours prior to anesthesia start to PACU discharge  Patient location during evaluation: bedside  Patient participation: complete - patient participated  Level of consciousness: awake  Pain management: adequate  Airway patency: patent  Anesthetic complications: no  Cardiovascular status: stable  Respiratory status: acceptable  Hydration status: acceptable  Post anesthesia nausea and vomiting:  controlled      No vitals data found for the desired time range.

## 2020-04-30 NOTE — ANESTHESIA PREPROCEDURE EVALUATION
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  HX ROTATOR CUFF REPAIR ([id number]) Right 2015   HX HYSTERECTOMY (SHX81)     HX APPENDECTOMY (SHX54)     HX OTHER SURGICAL (KRH037)     Left foot pain  Chronic pain of left ankle  Primary osteoarthritis of left ankle  Chronic pain syndrome               Physical Exam    Airway  Mallampati: II  TM Distance: 4 - 6 cm  Neck ROM: normal range of motion        Cardiovascular    Rhythm: regular  Rate: normal         Dental  No notable dental hx       Pulmonary  Breath sounds clear to auscultation               Abdominal  GI exam deferred       Other Findings            Anesthetic Plan    ASA: 2  Anesthesia type: general          Induction: Intravenous  Anesthetic plan and risks discussed with: Patient

## 2020-04-30 NOTE — DISCHARGE INSTRUCTIONS
DISCHARGE SUMMARY from Nurse    PATIENT INSTRUCTIONS:    After general anesthesia or intravenous sedation, for 24 hours or while taking prescription Narcotics:  · Limit your activities  · Do not drive and operate hazardous machinery  · Do not make important personal or business decisions  · Do  not drink alcoholic beverages  · If you have not urinated within 8 hours after discharge, please contact your surgeon on call. Report the following to your surgeon:  · Excessive pain, swelling, redness or odor of or around the surgical area  · Temperature over 100.5  · Nausea and vomiting lasting longer than 4 hours or if unable to take medications  · Any signs of decreased circulation or nerve impairment to extremity: change in color, persistent  numbness, tingling, coldness or increase pain  · Any questions    What to do at Home:  These are general instructions for a healthy lifestyle:    No smoking/ No tobacco products/ Avoid exposure to second hand smoke  Surgeon General's Warning:  Quitting smoking now greatly reduces serious risk to your health. Obesity, smoking, and sedentary lifestyle greatly increases your risk for illness    A healthy diet, regular physical exercise & weight monitoring are important for maintaining a healthy lifestyle    You may be retaining fluid if you have a history of heart failure or if you experience any of the following symptoms:  Weight gain of 3 pounds or more overnight or 5 pounds in a week, increased swelling in our hands or feet or shortness of breath while lying flat in bed. Please call your doctor as soon as you notice any of these symptoms; do not wait until your next office visit. The discharge information has been reviewed with the patient. The patient verbalized understanding.   Discharge medications reviewed with the patient and appropriate educational materials and side effects teaching were provided. ___________________________________________________________________________________________________________________________________  Patient armband removed and shredded    Patient Education   Learning About Coronavirus [phone])  Coronavirus [phone]): Overview  What is coronavirus (UKPGZ-25)? The coronavirus disease (COVID-19) is caused by a virus. It is an illness that was first found in [place], [place], in December 2019. It has since spread worldwide. The virus can cause fever, cough, and trouble breathing. In severe cases, it can cause pneumonia and make it hard to breathe without help. It can cause death. Coronaviruses are a large group of viruses. They cause the common cold. They also cause more serious illnesses like Middle East respiratory syndrome (MERS) and severe acute respiratory syndrome (SARS). COVID-19 is caused by a novel coronavirus. That means it's a new type that has not been seen in people before. This virus spreads person-to-person through droplets from coughing and sneezing. It can also spread when you are close to someone who is infected. And it can spread when you touch something that has the virus on it, such as a doorknob or a tabletop. What can you do to protect yourself from coronavirus (COVID-19)? The best way to protect yourself from getting sick is to:  · Avoid areas where there is an outbreak. · Avoid contact with people who may be infected. · Wash your hands often with soap or alcohol-based hand sanitizers. · Avoid crowds and try to stay at least 6 feet away from other people. · Wash your hands often, especially after you cough or sneeze. Use soap and water, and scrub for at least 20 seconds. If soap and water aren't available, use an alcohol-based hand . · Avoid touching your mouth, nose, and eyes. What can you do to avoid spreading the virus to others?   To help avoid spreading the virus to others:  · Cover your mouth with a tissue when you cough or sneeze. Then throw the tissue in the trash. · Use a disinfectant to clean things that you touch often. · Stay home if you are sick or have been exposed to the virus. Don't go to school, work, or public areas. And don't use public transportation. · If you are sick:  ? Leave your home only if you need to get medical care. But call the doctor's office first so they know you're coming. And wear a face mask, if you have one.  ? If you have a face mask, wear it whenever you're around other people. It can help stop the spread of the virus when you cough or sneeze. ? Clean and disinfect your home every day. Use household  and disinfectant wipes or sprays. Take special care to clean things that you grab with your hands. These include doorknobs, remote controls, phones, and handles on your refrigerator and microwave. And don't forget countertops, tabletops, bathrooms, and computer keyboards. When to call for help  Call 911 anytime you think you may need emergency care. For example, call if:  · You have severe trouble breathing. (You can't talk at all.)  · You have constant chest pain or pressure. · You are severely dizzy or lightheaded. · You are confused or can't think clearly. · Your face and lips have a blue color. · You pass out (lose consciousness) or are very hard to wake up. Call your doctor now if you develop symptoms such as:  · Shortness of breath. · Fever. · Cough. If you need to get care, call ahead to the doctor's office for instructions before you go. Make sure you wear a face mask, if you have one, to prevent exposing other people to the virus. Where can you get the latest information? The following health organizations are tracking and studying this virus. Their websites contain the most up-to-date information. Griffin Marvin also learn what to do if you think you may have been exposed to the virus. · U.S. Centers for Disease Control and Prevention (CDC):  The CDC provides updated news about the disease and travel advice. The website also tells you how to prevent the spread of infection. www.cdc.gov  · World Health Organization [facility]): WHO offers information about the virus outbreaks. WHO also has travel advice. www.who.int  Current as of: April 1, 2020               Content Version: 12.4  © 2441-6154 [employer], [employer]. Care instructions adapted under license by your healthcare professional. If you have questions about a medical condition or this instruction, always ask your healthcare professional. [address] any warranty or liability for your use of this information.

## 2020-05-01 ENCOUNTER — ANESTHESIA EVENT (OUTPATIENT)
Dept: SURGERY | Age: 65
End: 2020-05-01
Payer: MEDICARE

## 2020-05-01 NOTE — DISCHARGE SUMMARY
PAT - SURGICAL PRE-ADMISSION INSTRUCTIONS    NAME:  Chelle Brink                                                          TODAY'S DATE:  5/1/2020    SURGERY DATE:  5/4/2020                                  SURGERY ARRIVAL TIME:   TBV    1. Do NOT eat or drink anything, including candy or gum, after MIDNIGHT on 05/03/2020 , unless you have specific instructions from your Surgeon or Anesthesia Provider to do so. 2. No smoking on the day of surgery. 3. No alcohol 24 hours prior to the day of surgery. 4. No recreational drugs for one week prior to the day of surgery. 5. Leave all valuables, including money/purse, at home. 6. Remove all jewelry, nail polish, makeup (including mascara); no lotions, powders, deodorant, or perfume/cologne/after shave. 7. Glasses/Contact lenses and Dentures may be worn to the hospital.  They will be removed prior to surgery. 8. Call your doctor if symptoms of a cold or illness develop within 24 ours prior to surgery. 9. AN ADULT MUST DRIVE YOU HOME AFTER OUTPATIENT SURGERY. 10. If you are having an OUTPATIENT procedure, please make arrangements for a responsible adult to be with you for 24 hours after your surgery. 11. If you are admitted to the hospital, you will be assigned to a bed after surgery is complete. Normally a family member will not be able to see you until you are in your assigned bed. 15. Family is encouraged to accompany you to the hospital.  We ask visitors in the treatment area to be limited to ONE person at a time to ensure patient privacy. EXCEPTIONS WILL BE MADE AS NEEDED. 15. Children under 12 are discouraged from entering the treatment area and need to be supervised by an adult when in the waiting room. Special Instructions:    NONE. Patient Prep:    shower with anti-bacterial soap    These surgical instructions were reviewed with Cinthia Worley during the PAT  phone call. Directions:   On the morning of surgery, please go to the [facility]. Enter the building from the [facility] entrance, 1st floor (next to the Emergency Room entrance). Take the elevator to the 2nd floor. Sign in at the Registration Desk.     If you have any questions and/or concerns, please do not hesitate to call:  (Prior to the day of surgery)  [facility] unit:  [phone]  (Day of surgery)  [facility] unit:  [phone]

## 2020-05-04 ENCOUNTER — HOSPITAL ENCOUNTER (OUTPATIENT)
Dept: RADIATION THERAPY | Age: 65
Discharge: HOME | End: 2020-05-04
Attending: OPHTHALMOLOGY | Admitting: OPHTHALMOLOGY
Payer: MEDICARE

## 2020-05-04 ENCOUNTER — ANESTHESIA (OUTPATIENT)
Dept: SURGERY | Age: 65
End: 2020-05-04
Payer: MEDICARE

## 2020-05-04 ENCOUNTER — HOSPITAL ENCOUNTER (OUTPATIENT)
Age: 65
Setting detail: OUTPATIENT SURGERY
Discharge: HOME | End: 2020-05-04
Attending: OPHTHALMOLOGY | Admitting: OPHTHALMOLOGY
Payer: MEDICARE

## 2020-05-04 VITALS
DIASTOLIC BLOOD PRESSURE: 64 MMHG | WEIGHT: 110.5 LBS | HEIGHT: 63 IN | BODY MASS INDEX: 19.58 KG/M2 | TEMPERATURE: 98.4 F | RESPIRATION RATE: 16 BRPM | OXYGEN SATURATION: 94 % | SYSTOLIC BLOOD PRESSURE: 135 MMHG | HEART RATE: 78 BPM

## 2020-05-04 PROCEDURE — 74011250636 HC RX REV CODE- 250/636: Performed by: OPHTHALMOLOGY

## 2020-05-04 PROCEDURE — 77030018831 HC SOL IRR H20 BAXT -A: Performed by: OPHTHALMOLOGY

## 2020-05-04 PROCEDURE — 74011250636 HC RX REV CODE- 250/636: Performed by: NURSE ANESTHETIST, CERTIFIED REGISTERED

## 2020-05-04 PROCEDURE — 74011000250 HC RX REV CODE- 250: Performed by: OPHTHALMOLOGY

## 2020-05-04 PROCEDURE — 77030003029 HC SUT VCRL J&J -B: Performed by: OPHTHALMOLOGY

## 2020-05-04 PROCEDURE — 74011000250 HC RX REV CODE- 250: Performed by: NURSE ANESTHETIST, CERTIFIED REGISTERED

## 2020-05-04 PROCEDURE — 77030013353: Performed by: OPHTHALMOLOGY

## 2020-05-04 PROCEDURE — 77300 RADIATION THERAPY DOSE PLAN: CPT

## 2020-05-04 PROCEDURE — 76210000022 HC REC RM PH II 1.5 TO 2 HR: Performed by: OPHTHALMOLOGY

## 2020-05-04 PROCEDURE — 76060000031 HC ANESTHESIA FIRST 0.5 HR: Performed by: OPHTHALMOLOGY

## 2020-05-04 PROCEDURE — 77030002916 HC SUT ETHLN J&J -A: Performed by: OPHTHALMOLOGY

## 2020-05-04 PROCEDURE — 76010000154 HC OR TIME FIRST 0.5 HR: Performed by: OPHTHALMOLOGY

## 2020-05-04 RX ORDER — DEXAMETHASONE SODIUM PHOSPHATE 4 MG/ML
INJECTION, SOLUTION INTRA-ARTICULAR; INTRALESIONAL; INTRAMUSCULAR; INTRAVENOUS; SOFT TISSUE AS NEEDED
Status: DISCONTINUED | OUTPATIENT
Start: 2020-05-04 | End: 2020-05-04 | Stop reason: HOSPADM

## 2020-05-04 RX ORDER — GENTAMICIN SULFATE 40 MG/ML
INJECTION, SOLUTION INTRAMUSCULAR; INTRAVENOUS AS NEEDED
Status: DISCONTINUED | OUTPATIENT
Start: 2020-05-04 | End: 2020-05-04 | Stop reason: HOSPADM

## 2020-05-04 RX ORDER — SODIUM CHLORIDE 0.9 % (FLUSH) 0.9 %
5-40 SYRINGE (ML) INJECTION EVERY 8 HOURS
Status: DISCONTINUED | OUTPATIENT
Start: 2020-05-04 | End: 2020-05-04 | Stop reason: HOSPADM

## 2020-05-04 RX ORDER — MIDAZOLAM HYDROCHLORIDE 1 MG/ML
INJECTION, SOLUTION INTRAMUSCULAR; INTRAVENOUS AS NEEDED
Status: DISCONTINUED | OUTPATIENT
Start: 2020-05-04 | End: 2020-05-04 | Stop reason: HOSPADM

## 2020-05-04 RX ORDER — SODIUM CHLORIDE, SODIUM LACTATE, POTASSIUM CHLORIDE, CALCIUM CHLORIDE 600; 310; 30; 20 MG/100ML; MG/100ML; MG/100ML; MG/100ML
50 INJECTION, SOLUTION INTRAVENOUS CONTINUOUS
Status: DISCONTINUED | OUTPATIENT
Start: 2020-05-04 | End: 2020-05-04 | Stop reason: HOSPADM

## 2020-05-04 RX ORDER — PROPOFOL 10 MG/ML
INJECTION, EMULSION INTRAVENOUS AS NEEDED
Status: DISCONTINUED | OUTPATIENT
Start: 2020-05-04 | End: 2020-05-04 | Stop reason: HOSPADM

## 2020-05-04 RX ORDER — SODIUM CHLORIDE 0.9 % (FLUSH) 0.9 %
5-40 SYRINGE (ML) INJECTION AS NEEDED
Status: DISCONTINUED | OUTPATIENT
Start: 2020-05-04 | End: 2020-05-04 | Stop reason: HOSPADM

## 2020-05-04 RX ORDER — LIDOCAINE HYDROCHLORIDE 20 MG/ML
INJECTION, SOLUTION EPIDURAL; INFILTRATION; INTRACAUDAL; PERINEURAL AS NEEDED
Status: DISCONTINUED | OUTPATIENT
Start: 2020-05-04 | End: 2020-05-04 | Stop reason: HOSPADM

## 2020-05-04 RX ADMIN — SODIUM CHLORIDE, SODIUM LACTATE, POTASSIUM CHLORIDE, AND CALCIUM CHLORIDE 50 ML/HR: 600; 310; 30; 20 INJECTION, SOLUTION INTRAVENOUS at 15:31

## 2020-05-04 RX ADMIN — PROPOFOL 40 MG: 10 INJECTION, EMULSION INTRAVENOUS at 15:56

## 2020-05-04 RX ADMIN — MIDAZOLAM 2 MG: 1 INJECTION INTRAMUSCULAR; INTRAVENOUS at 15:51

## 2020-05-04 RX ADMIN — LIDOCAINE HYDROCHLORIDE 8 MG: 20 INJECTION, SOLUTION INTRAVENOUS at 15:56

## 2020-05-04 NOTE — DISCHARGE INSTRUCTIONS
DISCHARGE SUMMARY from Nurse    PATIENT INSTRUCTIONS:    After general anesthesia or intravenous sedation, for 24 hours or while taking prescription Narcotics:  · Limit your activities  · Do not drive and operate hazardous machinery  · Do not make important personal or business decisions  · Do  not drink alcoholic beverages  · If you have not urinated within 8 hours after discharge, please contact your surgeon on call. Report the following to your surgeon:  · Excessive pain, swelling, redness or odor of or around the surgical area  · Temperature over 100.5  · Nausea and vomiting lasting longer than 4 hours or if unable to take medications  · Any signs of decreased circulation or nerve impairment to extremity: change in color, persistent  numbness, tingling, coldness or increase pain  · Any questions    What to do at Home:  Recommended activity: {discharge activity:68711}, ***    If you experience any of the following symptoms ***, please follow up with ***. *  Please give a list of your current medications to your Primary Care Provider. *  Please update this list whenever your medications are discontinued, doses are      changed, or new medications (including over-the-counter products) are added. *  Please carry medication information at all times in case of emergency situations. These are general instructions for a healthy lifestyle:    No smoking/ No tobacco products/ Avoid exposure to second hand smoke  Surgeon General's Warning:  Quitting smoking now greatly reduces serious risk to your health.     Obesity, smoking, and sedentary lifestyle greatly increases your risk for illness    A healthy diet, regular physical exercise & weight monitoring are important for maintaining a healthy lifestyle    You may be retaining fluid if you have a history of heart failure or if you experience any of the following symptoms:  Weight gain of 3 pounds or more overnight or 5 pounds in a week, increased swelling in our hands or feet or shortness of breath while lying flat in bed. Please call your doctor as soon as you notice any of these symptoms; do not wait until your next office visit. The discharge information has been reviewed with the {PATIENT PARENT GUARDIAN:03022}. The {PATIENT PARENT GUARDIAN:68902} verbalized understanding. Discharge medications reviewed with the {Dishcarge meds reviewed JPZT:57297} and appropriate educational materials and side effects teaching were provided.   ___________________________________________________________________________________________________________________________________

## 2020-05-04 NOTE — ANESTHESIA POSTPROCEDURE EVALUATION
Procedure(s):  right eye removal of implanted material all indacted procedure. MAC    Anesthesia Post Evaluation      Multimodal analgesia: multimodal analgesia used between 6 hours prior to anesthesia start to PACU discharge  Patient location during evaluation: bedside  Patient participation: complete - patient participated  Level of consciousness: awake  Pain score: 0  Pain management: adequate  Airway patency: patent  Anesthetic complications: no  Cardiovascular status: stable  Respiratory status: acceptable  Hydration status: acceptable  Post anesthesia nausea and vomiting:  none      No vitals data found for the desired time range.

## 2020-05-04 NOTE — PERIOP NOTES
Pre-Op Summary    Pt arrived via car with family/friend and is oriented to time, place, person and situation. Patient with steady gait with none assistive devices. Visit Vitals  /75 (BP 1 Location: Left arm, BP Patient Position: At rest)   Pulse 83   Temp 98.4 °F (36.9 °C)   Resp 18   Ht 5' 3\" (1.6 m)   Wt 50.1 kg (110 lb 8 oz)   SpO2 95%   BMI 19.57 kg/m²       Peripheral IV located on Left antecubital .    Patients belongings are located locked. Patient's point of contact is Costa Guevara, wife and their contact number is: [phone]. They will be called for . They are able to receive medication information. They will be their ride home.

## 2020-05-04 NOTE — PERIOP NOTES
Phase 2 Recovery Summary    Report received from OR BECK Ang    Vitals:    05/04/20 1500 05/04/20 1514 05/04/20 1622 05/04/20 1625   BP: 126/75  135/64 135/64   Pulse: 83  79 78   Resp: 18   16   Temp:  98.4 °F (36.9 °C)     SpO2: 95%  94% 94%   Weight: 50.1 kg (110 lb 8 oz)      Height: 5' 3\" (1.6 m)          oriented to time, place, person and situation          Lines and Drains  Peripheral Intravenous Line:   Peripheral IV 05/04/20 Left Antecubital (Active)   Site Assessment Clean, dry, & intact 5/4/2020  4:18 PM   Phlebitis Assessment 0 5/4/2020  4:18 PM   Infiltration Assessment 0 5/4/2020  4:18 PM   Dressing Status Clean, dry, & intact 5/4/2020  4:18 PM   Dressing Type Tape;Transparent 5/4/2020  4:18 PM   Hub Color/Line Status Blue; Infusing 5/4/2020  4:18 PM   Alcohol Cap Used Yes 5/4/2020  3:30 PM       Wound  Wound Eye Right (Active)   Number of days: 54       Wound Eye Right (Active)   Number of days: 4       Wound Eye Right (Active)   Number of days: 4       Wound Eye Right (Active)   Dressing Status Clean, dry, and intact 5/4/2020  4:35 PM   Dressing Type Eye patch; Eye shield 5/4/2020  4:35 PM   Number of days: 0        Patient assisted to chair with minimal assist. Pateint tolerating liquids well. Discharge discussed with patient and family. No questions or concerns at this time. Patient had time to ask any questions. Pain at 0/10, gave no meds    Discharge medications reviewed with patient and appropriate educational materials and side effects teaching were provided. Patient discharged to home with [facility]. Discharge discussed over telephone due to pandemic.       Odilon Romero RN

## 2020-05-04 NOTE — ANESTHESIA PREPROCEDURE EVALUATION
Relevant Problems   No relevant active problems       Anesthetic History   No history of anesthetic complications            Review of Systems / Medical History  Patient summary reviewed and pertinent labs reviewed    Pulmonary  Within defined limits                 Neuro/Psych         Psychiatric history     Cardiovascular  Within defined limits                Exercise tolerance: >4 METS     GI/Hepatic/Renal  Within defined limits              Endo/Other        Arthritis     Other Findings              Physical Exam    Airway  Mallampati: II  TM Distance: 4 - 6 cm  Neck ROM: normal range of motion   Mouth opening: Normal     Cardiovascular  Regular rate and rhythm,  S1 and S2 normal,  no murmur, click, rub, or gallop  Rhythm: regular  Rate: normal         Dental    Dentition: Lower dentition intact and Upper dentition intact  Comments: Several missing molars   Pulmonary  Breath sounds clear to auscultation               Abdominal  GI exam deferred       Other Findings            Anesthetic Plan    ASA: 1  Anesthesia type: MAC          Induction: Intravenous  Anesthetic plan and risks discussed with: Patient

## 2020-07-20 ENCOUNTER — IMPORTED ENCOUNTER (OUTPATIENT)
Dept: URBAN - METROPOLITAN AREA CLINIC 1 | Facility: CLINIC | Age: 65
End: 2020-07-20

## 2020-07-20 PROBLEM — H26.491: Noted: 2020-07-20

## 2020-07-20 PROBLEM — H25.812: Noted: 2020-07-20

## 2020-07-20 PROBLEM — Z96.1: Noted: 2020-07-20

## 2020-07-20 PROCEDURE — 92012 INTRM OPH EXAM EST PATIENT: CPT

## 2020-07-20 PROCEDURE — 92136 OPHTHALMIC BIOMETRY: CPT

## 2020-07-20 NOTE — PATIENT DISCUSSION
1.  Cataract OS --  Visually Significant discussed the risks benefits alternatives and limitations of cataract surgery. The patient stated a full understanding and a desire to proceed with the procedure. Discussed with patient if PO Gtts are more than $120 for all three combined when filling at their Pharmacy please call our office to request generic substitutions. Pt came to pre-op appointment today alone and Lifestyle Questionnaire Completed. Pt understands they will need glasses post-op to achieve their best corrected vision. Phaco PCL2. PCO OD- Visually Significant *secondary to glare*; schedule YAG Cap. Pros cons risks and benefits. 3.  Pseudophakia OD - Standard4. 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 Followed by Arcadio O[place]o. 5.  Pinguecula OU -- Use of sunglasses when exposed to UV light and observation is recommended. Return for an appointment in YAG Cap OD (CVSE) with Dr. Manuel Story. RTC as scheduled for POD#1 Phaco/PCL OS with Dr. Manuel Story.

## 2020-07-29 ENCOUNTER — IMPORTED ENCOUNTER (OUTPATIENT)
Dept: URBAN - METROPOLITAN AREA CLINIC 1 | Facility: CLINIC | Age: 65
End: 2020-07-29

## 2020-08-06 ENCOUNTER — IMPORTED ENCOUNTER (OUTPATIENT)
Dept: URBAN - METROPOLITAN AREA CLINIC 1 | Facility: CLINIC | Age: 65
End: 2020-08-06

## 2020-08-06 PROBLEM — Z96.1: Noted: 2020-08-06

## 2020-08-06 PROCEDURE — 99024 POSTOP FOLLOW-UP VISIT: CPT

## 2020-08-06 NOTE — PATIENT DISCUSSION
1. POD#1 CE/IOL OS (Standard) doing well. *Dextenza internalizedUse  Prolensa Qdaily OS Ocuflox TID OS : Use all three gtts through completion of PO gtt chart regimen/ Per our instructions given to patient. 2.  Pseudophakia OD (Standard) - **H/o Fundus Mass OD followed by Franky Torres.  Post op Warnings Reiterated RTC as scheduled

## 2020-10-13 ENCOUNTER — TRANSCRIBE ORDER (OUTPATIENT)
Dept: SCHEDULING | Age: 65
End: 2020-10-13

## 2020-10-13 ENCOUNTER — IMPORTED ENCOUNTER (OUTPATIENT)
Dept: URBAN - METROPOLITAN AREA CLINIC 1 | Facility: CLINIC | Age: 65
End: 2020-10-13

## 2020-10-13 DIAGNOSIS — C69.31 CHOROID MELANOMA OF RIGHT EYE (HCC): ICD-10-CM

## 2020-10-13 DIAGNOSIS — C69.00: Primary | ICD-10-CM

## 2020-10-13 PROBLEM — Z96.1: Noted: 2020-10-13

## 2020-10-13 PROCEDURE — 99024 POSTOP FOLLOW-UP VISIT: CPT

## 2020-10-13 NOTE — PATIENT DISCUSSION
1. POM#1 CE/IOL OS (Standard) doing well. *Dextenza internalized  Use Prolensa Qdaily OD: Use through completion of po gtt regimen. 2.  H/o YAG Cap OD - *Malignant Retinal Tumor OD - H/o Radiation. Followed by Jess Brewster. MRX for glasses given. Return for an appointment in 4 months 27 with Dr. Lo San.

## 2020-10-15 ENCOUNTER — HOSPITAL ENCOUNTER (OUTPATIENT)
Dept: CT IMAGING | Age: 65
Discharge: HOME | End: 2020-10-15
Attending: OPHTHALMOLOGY
Payer: MEDICARE

## 2020-10-15 DIAGNOSIS — C69.00: ICD-10-CM

## 2020-10-15 DIAGNOSIS — C69.31 CHOROID MELANOMA OF RIGHT EYE (HCC): ICD-10-CM

## 2020-10-15 LAB — CREAT UR-MCNC: 0.8 MG/DL (ref 0.6–1.3)

## 2020-10-15 PROCEDURE — 74011000636 HC RX REV CODE- 636: Performed by: OPHTHALMOLOGY

## 2020-10-15 PROCEDURE — 82565 ASSAY OF CREATININE: CPT

## 2020-10-15 PROCEDURE — 74160 CT ABDOMEN W/CONTRAST: CPT

## 2020-10-15 RX ADMIN — IOPAMIDOL 100 ML: 612 INJECTION, SOLUTION INTRAVENOUS at 14:39

## 2020-12-16 ENCOUNTER — TRANSCRIBE ORDER (OUTPATIENT)
Dept: SCHEDULING | Age: 65
End: 2020-12-16

## 2020-12-16 DIAGNOSIS — R91.8 PULMONARY NODULES: Primary | ICD-10-CM

## 2020-12-17 ENCOUNTER — HOSPITAL ENCOUNTER (OUTPATIENT)
Dept: CT IMAGING | Age: 65
Discharge: HOME | End: 2020-12-17
Attending: SPECIALIST
Payer: MEDICARE

## 2020-12-17 DIAGNOSIS — R91.8 PULMONARY NODULES: ICD-10-CM

## 2020-12-17 LAB — CREAT UR-MCNC: 0.8 MG/DL (ref 0.6–1.3)

## 2020-12-17 PROCEDURE — 71260 CT THORAX DX C+: CPT

## 2020-12-17 PROCEDURE — 82565 ASSAY OF CREATININE: CPT

## 2020-12-17 PROCEDURE — 74011000636 HC RX REV CODE- 636: Performed by: SPECIALIST

## 2020-12-17 RX ADMIN — IOPAMIDOL 60 ML: 612 INJECTION, SOLUTION INTRAVENOUS at 15:00

## 2021-04-19 ENCOUNTER — TRANSCRIBE ORDER (OUTPATIENT)
Dept: SCHEDULING | Age: 66
End: 2021-04-19

## 2021-04-19 DIAGNOSIS — T66.XXXD RADIATION RETINOPATHY, SUBSEQUENT ENCOUNTER: Primary | ICD-10-CM

## 2021-04-19 DIAGNOSIS — H35.89 RADIATION RETINOPATHY, SUBSEQUENT ENCOUNTER: Primary | ICD-10-CM

## 2021-05-12 ENCOUNTER — TRANSCRIBE ORDER (OUTPATIENT)
Dept: SCHEDULING | Age: 66
End: 2021-05-12

## 2021-05-12 DIAGNOSIS — T66.XXXA RADIATION RETINOPATHY: Primary | ICD-10-CM

## 2021-05-12 DIAGNOSIS — H35.89 RADIATION RETINOPATHY: Primary | ICD-10-CM

## 2021-05-13 ENCOUNTER — TRANSCRIBE ORDER (OUTPATIENT)
Dept: SCHEDULING | Age: 66
End: 2021-05-13

## 2021-05-16 ENCOUNTER — TRANSCRIBE ORDER (OUTPATIENT)
Dept: SCHEDULING | Age: 66
End: 2021-05-16

## 2021-05-16 DIAGNOSIS — C69.31 CHOROID MELANOMA OF RIGHT EYE (HCC): Primary | ICD-10-CM

## 2021-05-25 ENCOUNTER — HOSPITAL ENCOUNTER (OUTPATIENT)
Dept: CT IMAGING | Age: 66
Discharge: HOME | End: 2021-05-25
Attending: OPHTHALMOLOGY
Payer: MEDICARE

## 2021-05-25 DIAGNOSIS — C69.31 CHOROID MELANOMA OF RIGHT EYE (HCC): ICD-10-CM

## 2021-05-25 LAB — CREAT UR-MCNC: 0.8 MG/DL (ref 0.6–1.3)

## 2021-05-25 PROCEDURE — 74160 CT ABDOMEN W/CONTRAST: CPT

## 2021-05-25 PROCEDURE — 82565 ASSAY OF CREATININE: CPT

## 2021-05-25 PROCEDURE — 74011000636 HC RX REV CODE- 636: Performed by: OPHTHALMOLOGY

## 2021-05-25 RX ADMIN — IOPAMIDOL 100 ML: 612 INJECTION, SOLUTION INTRAVENOUS at 09:57

## 2021-11-19 ENCOUNTER — TRANSCRIBE ORDER (OUTPATIENT)
Dept: SCHEDULING | Age: 66
End: 2021-11-19

## 2021-11-19 DIAGNOSIS — C69.31 CHOROID MELANOMA OF RIGHT EYE (HCC): Primary | ICD-10-CM

## 2021-11-26 ENCOUNTER — HOSPITAL ENCOUNTER (OUTPATIENT)
Dept: CT IMAGING | Age: 66
Discharge: HOME | End: 2021-11-26
Attending: OPHTHALMOLOGY
Payer: MEDICARE

## 2021-11-26 DIAGNOSIS — C69.31 CHOROID MELANOMA OF RIGHT EYE (HCC): ICD-10-CM

## 2021-11-26 LAB — CREAT UR-MCNC: 0.8 MG/DL (ref 0.6–1.3)

## 2021-11-26 PROCEDURE — 74177 CT ABD & PELVIS W/CONTRAST: CPT

## 2021-11-26 PROCEDURE — 74011000636 HC RX REV CODE- 636: Performed by: OPHTHALMOLOGY

## 2021-11-26 PROCEDURE — 82565 ASSAY OF CREATININE: CPT

## 2021-11-26 RX ADMIN — IOPAMIDOL 100 ML: 612 INJECTION, SOLUTION INTRAVENOUS at 11:15

## 2021-12-13 ENCOUNTER — OFFICE VISIT (OUTPATIENT)
Dept: ORTHOPEDIC SURGERY | Age: 66
End: 2021-12-13
Payer: MEDICARE

## 2021-12-13 VITALS
HEIGHT: 63 IN | OXYGEN SATURATION: 98 % | TEMPERATURE: 98.2 F | WEIGHT: 127 LBS | HEART RATE: 104 BPM | BODY MASS INDEX: 22.5 KG/M2

## 2021-12-13 DIAGNOSIS — M15.1 DEGENERATIVE ARTHRITIS OF DISTAL INTERPHALANGEAL JOINT OF INDEX FINGER OF LEFT HAND: ICD-10-CM

## 2021-12-13 DIAGNOSIS — M65.352 TRIGGER LITTLE FINGER OF LEFT HAND: ICD-10-CM

## 2021-12-13 DIAGNOSIS — M15.1 DEGENERATIVE ARTHRITIS OF DISTAL INTERPHALANGEAL JOINT OF MIDDLE FINGER OF LEFT HAND: ICD-10-CM

## 2021-12-13 DIAGNOSIS — M65.312 TRIGGER THUMB OF LEFT HAND: Primary | ICD-10-CM

## 2021-12-13 DIAGNOSIS — M19.042 PRIMARY OSTEOARTHRITIS OF LEFT HAND: ICD-10-CM

## 2021-12-13 PROCEDURE — 99214 OFFICE O/P EST MOD 30 MIN: CPT | Performed by: ORTHOPAEDIC SURGERY

## 2021-12-13 PROCEDURE — 20550 NJX 1 TENDON SHEATH/LIGAMENT: CPT | Performed by: ORTHOPAEDIC SURGERY

## 2021-12-13 PROCEDURE — G8432 DEP SCR NOT DOC, RNG: HCPCS | Performed by: ORTHOPAEDIC SURGERY

## 2021-12-13 PROCEDURE — G8427 DOCREV CUR MEDS BY ELIG CLIN: HCPCS | Performed by: ORTHOPAEDIC SURGERY

## 2021-12-13 PROCEDURE — G8400 PT W/DXA NO RESULTS DOC: HCPCS | Performed by: ORTHOPAEDIC SURGERY

## 2021-12-13 PROCEDURE — 3017F COLORECTAL CA SCREEN DOC REV: CPT | Performed by: ORTHOPAEDIC SURGERY

## 2021-12-13 PROCEDURE — G8536 NO DOC ELDER MAL SCRN: HCPCS | Performed by: ORTHOPAEDIC SURGERY

## 2021-12-13 PROCEDURE — 73130 X-RAY EXAM OF HAND: CPT | Performed by: ORTHOPAEDIC SURGERY

## 2021-12-13 PROCEDURE — 1090F PRES/ABSN URINE INCON ASSESS: CPT | Performed by: ORTHOPAEDIC SURGERY

## 2021-12-13 PROCEDURE — G8420 CALC BMI NORM PARAMETERS: HCPCS | Performed by: ORTHOPAEDIC SURGERY

## 2021-12-13 PROCEDURE — 1101F PT FALLS ASSESS-DOCD LE1/YR: CPT | Performed by: ORTHOPAEDIC SURGERY

## 2021-12-13 NOTE — PROGRESS NOTES
Chelle Brink is a 77 y.o. female right handed retiree. Worker's Compensation and legal considerations: none filed. Vitals:    12/13/21 1022   Pulse: (!) 104   Temp: 98.2 °F (36.8 °C)   TempSrc: Temporal   SpO2: 98%   Weight: 127 lb (57.6 kg)   Height: 5' 3\" (1.6 m)   PainSc:   8   PainLoc: Hand           Chief Complaint   Patient presents with    Hand Pain     left hand         HPI: Patient presents today with complaints of left thumb and little finger pain and locking. She also reports occasional pain in the index and middle finger with deformity noted at the tip of the finger. Date of onset: Chronic    Injury: No    Prior Treatment:  No    Numbness/ Tingling: No      ROS: Review of Systems - General ROS: negative  Psychological ROS: negative  ENT ROS: negative  Allergy and Immunology ROS: negative  Hematological and Lymphatic ROS: negative  Respiratory ROS: no cough, shortness of breath, or wheezing  Cardiovascular ROS: no chest pain or dyspnea on exertion  Gastrointestinal ROS: no abdominal pain, change in bowel habits, or black or bloody stools  Musculoskeletal ROS: negative  Neurological ROS: negative  Dermatological ROS: negative    Past Medical History:   Diagnosis Date    H/O seasonal allergies     Psychiatric disorder     depression, anxiety       Past Surgical History:   Procedure Laterality Date    HX APPENDECTOMY      HX HYSTERECTOMY      HX NEPHRECTOMY Right 2003    donor    HX OTHER SURGICAL Left     foot surgery    HX ROTATOR CUFF REPAIR Right 2015       Current Outpatient Medications   Medication Sig Dispense Refill    vit B Cmplx 3-FA-Vit C-Biotin (NEPHRO CINDY RX) 1- mg-mg-mcg tablet Take 1 Tab by mouth daily.  escitalopram oxalate (LEXAPRO) 20 mg tablet Take 20 mg by mouth daily.  traZODone (DESYREL) 50 mg tablet Take  by mouth nightly as needed.  Cetirizine (ZYRTEC) 10 mg cap Take  by mouth daily as needed.        Current Facility-Administered Medications Medication Dose Route Frequency Provider Last Rate Last Admin    triamcinolone acetonide (KENALOG) 10 mg/mL injection 10 mg  10 mg Other ONCE Sonny Centeno, DO           Allergies   Allergen Reactions    Codeine Other (comments)     palpitations    Morphine Other (comments)     Low blood pressure           PE:     Physical Exam  Vitals and nursing note reviewed. Constitutional:       General: She is not in acute distress. Appearance: Normal appearance. She is not ill-appearing. Cardiovascular:      Pulses: Normal pulses. Pulmonary:      Effort: Pulmonary effort is normal. No respiratory distress. Musculoskeletal:         General: Swelling and tenderness present. No deformity or signs of injury. Cervical back: Normal range of motion and neck supple. Right lower leg: No edema. Left lower leg: No edema. Skin:     General: Skin is warm and dry. Capillary Refill: Capillary refill takes less than 2 seconds. Findings: No bruising or erythema. Neurological:      General: No focal deficit present. Mental Status: She is alert and oriented to person, place, and time. Psychiatric:         Mood and Affect: Mood normal.         Behavior: Behavior normal.            Hand:    Examination L Digit(s) R Digit(s)   1st CMC Tenderness -  -    1st CMC Grind -  -    Don Nodes -  -    [place] Nodes -  -    A1 Pulley Tenderness + Th, SF -    Triggering +  -    UCL Instability -  -    RCL Instability -  -    Lateral Stress Pain -  -    Palmar Cords -  -    Tabletop test -  -    Garrod's Pads -  -     Strength       Pinch Strength         ROM: Full        Imagin2021 3 views of left hand is significant for moderate to severe degenerative changes at the index and middle finger DIP joints. Additionally the patient is status post trapeziectomy of the thumb. ICD-10-CM ICD-9-CM    1.  Trigger thumb of left hand  M65.312 727.03 triamcinolone acetonide (KENALOG) 10 mg/mL injection 10 mg      INJECT TENDON SHEATH/LIGAMENT   2. Trigger little finger of left hand  M65.352 727.03 triamcinolone acetonide (KENALOG) 10 mg/mL injection 10 mg      INJECT TENDON SHEATH/LIGAMENT   3. Primary osteoarthritis of left hand  M19.042 715.14 AMB POC XRAY, HAND; 3+ VIEWS   4. 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 Discussed the possible need for injections of her index and middle finger DIP joint for arthritis versus a fusion in the future. Follow-up and Dispositions    · Return if symptoms worsen or fail to improve. Plan was reviewed with patient, who verbalized agreement and understanding of the plan    2042 [facility] NOTE        Chart reviewed for the following:   Sonny MINOR DO, have reviewed the History, Physical and updated the Allergic reactions for [place]     TIME OUT performed immediately prior to start of procedure:   Sonny MINOR DO, have performed the following reviews on [place] prior to the start of the procedure:            * Patient was identified by name and date of birth   * Agreement on procedure being performed was verified  * Risks and Benefits explained to the patient  * Procedure site verified and marked as necessary  * Patient was positioned for comfort  * Consent was signed and verified     Time: 10:56 AM      Date of procedure: 12/13/2021    Procedure performed by:   Haim Collins DO    Provider assisted by: Adalberto Cabrera LPN    Patient assisted by: self    How tolerated by patient: tolerated the procedure well with no complications    Post Procedural Pain Scale: 0 - No Hurt    Comments: none    Procedure:  After consent was obtained, using sterile technique the left thumb and little finger was prepped. Local anesthetic used: 1% lidocaine. Kenalog 5 mg X2 and was then injected and the needle withdrawn. The procedure was well tolerated. The patient is asked to continue to rest the area for a few more days before resuming regular activities. It may be more painful for the first 1-2 days. Watch for fever, or increased swelling or persistent pain in the joint. Call or return to clinic prn if such symptoms occur or there is failure to improve as anticipated.

## 2022-02-18 ENCOUNTER — OFFICE VISIT (OUTPATIENT)
Dept: ORTHOPEDIC SURGERY | Age: 67
End: 2022-02-18
Payer: MEDICARE

## 2022-02-18 VITALS
BODY MASS INDEX: 22.02 KG/M2 | WEIGHT: 129 LBS | HEIGHT: 64 IN | HEART RATE: 92 BPM | TEMPERATURE: 97.7 F | OXYGEN SATURATION: 97 %

## 2022-02-18 DIAGNOSIS — M25.512 ACUTE PAIN OF LEFT SHOULDER: ICD-10-CM

## 2022-02-18 DIAGNOSIS — M19.012 PRIMARY OSTEOARTHRITIS OF LEFT SHOULDER: Primary | ICD-10-CM

## 2022-02-18 PROCEDURE — 73030 X-RAY EXAM OF SHOULDER: CPT | Performed by: ORTHOPAEDIC SURGERY

## 2022-02-18 PROCEDURE — 1090F PRES/ABSN URINE INCON ASSESS: CPT | Performed by: ORTHOPAEDIC SURGERY

## 2022-02-18 PROCEDURE — G8432 DEP SCR NOT DOC, RNG: HCPCS | Performed by: ORTHOPAEDIC SURGERY

## 2022-02-18 PROCEDURE — G8427 DOCREV CUR MEDS BY ELIG CLIN: HCPCS | Performed by: ORTHOPAEDIC SURGERY

## 2022-02-18 PROCEDURE — 1101F PT FALLS ASSESS-DOCD LE1/YR: CPT | Performed by: ORTHOPAEDIC SURGERY

## 2022-02-18 PROCEDURE — 3017F COLORECTAL CA SCREEN DOC REV: CPT | Performed by: ORTHOPAEDIC SURGERY

## 2022-02-18 PROCEDURE — 99214 OFFICE O/P EST MOD 30 MIN: CPT | Performed by: ORTHOPAEDIC SURGERY

## 2022-02-18 PROCEDURE — G8400 PT W/DXA NO RESULTS DOC: HCPCS | Performed by: ORTHOPAEDIC SURGERY

## 2022-02-18 PROCEDURE — G8420 CALC BMI NORM PARAMETERS: HCPCS | Performed by: ORTHOPAEDIC SURGERY

## 2022-02-18 PROCEDURE — G8536 NO DOC ELDER MAL SCRN: HCPCS | Performed by: ORTHOPAEDIC SURGERY

## 2022-02-18 PROCEDURE — 20610 DRAIN/INJ JOINT/BURSA W/O US: CPT | Performed by: ORTHOPAEDIC SURGERY

## 2022-02-18 RX ORDER — TRIAMCINOLONE ACETONIDE 40 MG/ML
40 INJECTION, SUSPENSION INTRA-ARTICULAR; INTRAMUSCULAR ONCE
Status: COMPLETED | OUTPATIENT
Start: 2022-02-18 | End: 2022-02-18

## 2022-02-18 RX ADMIN — TRIAMCINOLONE ACETONIDE 40 MG: 40 INJECTION, SUSPENSION INTRA-ARTICULAR; INTRAMUSCULAR at 14:57

## 2022-02-18 NOTE — PROGRESS NOTES
Patient: Hanh Jessica                MRN: [id number]       SSN: [id number]  YOB: 1955        AGE: 74 y.o. SEX: female  Body mass index is 22.49 kg/m². PCP: Geovanna William MD  02/18/22    CHIEF COMPLAINT: Left shoulder pain    HPI: Hanh Jessica is a 74 y.o. female patient who comes to the office today with left shoulder pain for the past few months. She has tried over-the-counter medications as well as ice and heat packs on the left shoulder without resolution of her symptoms. She denies any specific injury or trauma. She rates the pain is 8 out of 10 on the pain scale. She says for the past 2 weeks it has been much worse. Past Medical History:   Diagnosis Date    H/O seasonal allergies     Psychiatric disorder     depression, anxiety       History reviewed. No pertinent family history. Current Outpatient Medications   Medication Sig Dispense Refill    vit B Cmplx 3-FA-Vit C-Biotin (NEPHRO CINDY RX) 1- mg-mg-mcg tablet Take 1 Tab by mouth daily.  escitalopram oxalate (LEXAPRO) 20 mg tablet Take 20 mg by mouth daily.  traZODone (DESYREL) 50 mg tablet Take  by mouth nightly as needed.  Cetirizine (ZYRTEC) 10 mg cap Take  by mouth daily as needed.        Current Facility-Administered Medications   Medication Dose Route Frequency Provider Last Rate Last Admin    triamcinolone acetonide (KENALOG-40) 40 mg/mL injection 40 mg  40 mg Intra artICUlar ONCE Maude Phelps MD           Allergies   Allergen Reactions    Codeine Other (comments)     palpitations    Morphine Other (comments)     Low blood pressure       Past Surgical History:   Procedure Laterality Date    HX APPENDECTOMY      HX HIP REPLACEMENT      HX HYSTERECTOMY      HX NEPHRECTOMY Right 2003    donor    HX OTHER SURGICAL Left     foot surgery    HX ROTATOR CUFF REPAIR Right 2015    [place] ANESTH,SURGERY OF SHOULDER         Social History     Socioeconomic History    Marital status:      Spouse name: Not on file    Number of children: Not on file    Years of education: Not on file    Highest education level: Not on file   Occupational History    Not on file   Tobacco Use    Smoking status: Current Every Day Smoker     Packs/day: 0.50    Smokeless tobacco: Never Used   Substance and Sexual Activity    Alcohol use: No    Drug use: No    Sexual activity: Not on file   Other Topics Concern    Not on file   Social History Narrative    Not on file     Social Determinants of Health     Financial Resource Strain:     Difficulty of Paying Living Expenses: Not on file   Food Insecurity:     Worried About Running Out of Food in the Last Year: Not on file    Megha of Food in the Last Year: Not on file   Transportation Needs:     Lack of Transportation (Medical): Not on file    Lack of Transportation (Non-Medical):  Not on file   Physical Activity:     Days of Exercise per Week: Not on file    Minutes of Exercise per Session: Not on file   Stress:     Feeling of Stress : Not on file   Social Connections:     Frequency of Communication with Friends and Family: Not on file    Frequency of Social Gatherings with Friends and Family: Not on file    Attends Christianity Services: Not on file    Active Member of [address] [place] [address] [employer] or Organizations: Not on file    Attends Club or Organization Meetings: Not on file    Marital Status: Not on file   Intimate Partner Violence:     Fear of Current or Ex-Partner: Not on file    Emotionally Abused: Not on file    Physically Abused: Not on file    Sexually Abused: Not on file   Housing Stability:     Unable to Pay for Housing in the Last Year: Not on file    Number of [place] in the Last Year: Not on file    Unstable Housing in the Last Year: Not on file       REVIEW OF SYSTEMS:    14 point review of systems on the intake form is negative except as noted in the HPI    PHYSICAL EXAMINATION:  Visit Vitals  Pulse 92   Temp 97.7 °F (36.5 °C)   Ht 5' 3.5\" (1.613 m)   Wt 129 lb (58.5 kg)   SpO2 97%   BMI 22.49 kg/m²     Body mass index is 22.49 kg/m². GENERAL: Alert and oriented x3, in no acute distress, well-developed, well-nourished. HEENT: Normocephalic, atraumatic. Shoulder Examination     R   L  ROM   FF  Full   140  ER  Full   40   IR  Full   LS  Rotator Cuff Pain   Supra  -   +   Infra  -   -   Subscap -   -  Crepitus  -   +  Effusion  -   -  Warmth  -   -   Erythema  -   -  Instability  -   -  AC Joint TTP  -   -  Clavicle   Deformity -   -   TTP  -   -  Proximal Humerus   Deformity -   -   TTP  -   -  Deltoid Strength 5   5  Biceps Strength 5   5  Biceps Deformity -   -  Biceps Groove Pain -   -  Impingement Sign -   -       IMAGING:  X-rays 4 views of the left shoulder were taken in the office today. My interpretation of these x-rays is glenohumeral osteoarthritis. Joint space narrowing. Slight scalloping of the greater tuberosity. ASSESSMENT & PLAN  Diagnosis: Left shoulder osteoarthritis, possible rotator cuff pathology    Adelita has symptomatic left shoulder osteoarthritis. We discussed the treatment options today. We discussed the possibility of surgery and although she does have arthritis which I think would respond well and likely will need surgery in the future if especially if it does not respond to this treatment she would like to hold off on surgery. I recommended continue with oral anti-inflammatories as needed. I do not recommend physical therapy for this. To try to give her some more immediate pain relief and hopefully long-lasting pain relief the left shoulder was injected with a steroid injection today. Prescription medication management discussed.      [facility]  OFFICE PROCEDURE PROGRESS NOTE        Chart reviewed for the following:   Ashely Ray MD, have reviewed the History, Physical and updated the Allergic reactions for Adelita Conrad     TIME OUT performed immediately prior to start of procedure:   Breann Flores MD, have performed the following reviews on [place] [address] prior to the start of the procedure:            * Patient was identified by name and date of birth   * Agreement on procedure being performed was verified  * Risks and Benefits explained to the patient  * Procedure site verified and marked as necessary  * Patient was positioned for comfort  * Consent was signed and verified    Time: 2:58 PM    Location: Left shoulder joint intra-articular injection  GHJ    Kenalog 40mg & 3cc Lidocaine    Date of procedure: 2/18/2022    Procedure performed by:  Colette Barrios MD    Provider assisted by: Tera Nielsen LPN    Patient assisted by: self    How tolerated by patient: tolerated the procedure well with no complications    Post Procedural Pain Scale: 0 - No Hurt    Comments: none      Electronically signed by: Colette Barrios MD    Note: This note was completed using voice recognition software.   Any typographical/name errors or mistakes are unintentional.

## 2022-03-18 PROBLEM — G89.4 CHRONIC PAIN SYNDROME: Status: ACTIVE | Noted: 2018-05-09

## 2022-03-18 PROBLEM — M79.672 LEFT FOOT PAIN: Status: ACTIVE | Noted: 2018-05-09

## 2022-03-20 PROBLEM — M25.572 CHRONIC PAIN OF LEFT ANKLE: Status: ACTIVE | Noted: 2018-05-09

## 2022-03-20 PROBLEM — M19.072 PRIMARY OSTEOARTHRITIS OF LEFT ANKLE: Status: ACTIVE | Noted: 2018-05-09

## 2022-03-20 PROBLEM — G89.29 CHRONIC PAIN OF LEFT ANKLE: Status: ACTIVE | Noted: 2018-05-09

## 2022-04-02 ASSESSMENT — VISUAL ACUITY
OS_CC: 20/30-2
OS_CC: 20/40
OS_CC: 20/40
OD_CC: 20/100
OD_CC: 20/80
OS_GLARE: 20/70
OD_GLARE: 20/400
OS_CC: 20/30-2
OD_CC: 20/50
OD_GLARE: 20/400
OS_CC: 20/30
OD_CC: 20/70
OS_GLARE: 20/70
OD_CC: 20/100
OS_GLARE: 20/70
OD_GLARE: 20/400
OD_PH: SC 20/40

## 2022-04-02 ASSESSMENT — TONOMETRY
OS_IOP_MMHG: 14
OS_IOP_MMHG: 14
OD_IOP_MMHG: 14
OS_IOP_MMHG: 18
OD_IOP_MMHG: 12
OD_IOP_MMHG: 14
OS_IOP_MMHG: 15
OD_IOP_MMHG: 14

## 2022-04-02 ASSESSMENT — KERATOMETRY
OD_K1POWER_DIOPTERS: 46.25
OD_K2POWER_DIOPTERS: 47.25
OS_AXISANGLE2_DEGREES: 057
OD_AXISANGLE2_DEGREES: 084
OS_K2POWER_DIOPTERS: 47.50
OS_AXISANGLE_DEGREES: 147
OS_K1POWER_DIOPTERS: 46.00
OD_AXISANGLE_DEGREES: 174

## 2022-08-10 NOTE — PATIENT DISCUSSION
LVM following up on call from yesterday.   Rec: Rocio SMALLWOOD with [facility] Goal: Andra SMALLWOOD.

## 2023-08-17 SDOH — HEALTH STABILITY: PHYSICAL HEALTH: ON AVERAGE, HOW MANY DAYS PER WEEK DO YOU ENGAGE IN MODERATE TO STRENUOUS EXERCISE (LIKE A BRISK WALK)?: 0 DAYS

## 2023-08-17 ASSESSMENT — SOCIAL DETERMINANTS OF HEALTH (SDOH)
WITHIN THE LAST YEAR, HAVE TO BEEN RAPED OR FORCED TO HAVE ANY KIND OF SEXUAL ACTIVITY BY YOUR PARTNER OR EX-PARTNER?: NO
WITHIN THE LAST YEAR, HAVE YOU BEEN HUMILIATED OR EMOTIONALLY ABUSED IN OTHER WAYS BY YOUR PARTNER OR EX-PARTNER?: NO
WITHIN THE LAST YEAR, HAVE YOU BEEN KICKED, HIT, SLAPPED, OR OTHERWISE PHYSICALLY HURT BY YOUR PARTNER OR EX-PARTNER?: NO
WITHIN THE LAST YEAR, HAVE YOU BEEN AFRAID OF YOUR PARTNER OR EX-PARTNER?: NO

## 2023-08-18 ENCOUNTER — OFFICE VISIT (OUTPATIENT)
Age: 68
End: 2023-08-18

## 2023-08-18 VITALS — BODY MASS INDEX: 22.14 KG/M2 | HEIGHT: 64 IN

## 2023-08-18 DIAGNOSIS — M25.552 PAIN IN LEFT HIP: Primary | ICD-10-CM

## 2023-08-18 DIAGNOSIS — M16.12 UNILATERAL PRIMARY OSTEOARTHRITIS, LEFT HIP: ICD-10-CM

## 2023-08-18 DIAGNOSIS — M25.551 PAIN IN RIGHT HIP: ICD-10-CM

## 2023-08-18 DIAGNOSIS — Z98.890 HISTORY OF OPEN REDUCTION AND INTERNAL FIXATION (ORIF) PROCEDURE: ICD-10-CM

## 2023-08-18 DIAGNOSIS — M70.62 TROCHANTERIC BURSITIS OF LEFT HIP: ICD-10-CM

## 2023-08-18 RX ORDER — BETAMETHASONE SODIUM PHOSPHATE AND BETAMETHASONE ACETATE 3; 3 MG/ML; MG/ML
6 INJECTION, SUSPENSION INTRA-ARTICULAR; INTRALESIONAL; INTRAMUSCULAR; SOFT TISSUE ONCE
Status: COMPLETED | OUTPATIENT
Start: 2023-08-18 | End: 2023-08-18

## 2023-08-18 RX ORDER — MELOXICAM 15 MG/1
15 TABLET ORAL DAILY
Qty: 30 TABLET | Refills: 2 | Status: SHIPPED | OUTPATIENT
Start: 2023-08-18

## 2023-08-18 RX ADMIN — BETAMETHASONE SODIUM PHOSPHATE AND BETAMETHASONE ACETATE 6 MG: 3; 3 INJECTION, SUSPENSION INTRA-ARTICULAR; INTRALESIONAL; INTRAMUSCULAR; SOFT TISSUE at 09:27

## 2023-08-18 NOTE — PROGRESS NOTES
osteopenia    Plan: At this point, we discussed treatment options. We will move forth a cortisone injection for the left hip, trochanter bursa. After informed consent, under aseptic conditions, with US guided assitance, the left hip was prepped with betadine and a mxiture of 3ml 1% lidocaine and 6mg of celestone was injected without complications. The patient tolerated the injection well. The patient is instructed on post-injection care. We will order an MRI of the left hip for further evaluation of AVN. We will obtain a bone density test.  We will start her on Mobic 15 mg once a day with food. She is instructed on use as well as usual precautions. We will see her back after the MRI for further evaluation. Chart reviewed for the following:  Ele BEAN PA-C, have reviewed the History, Physical and updated the Allergic reactions for [place]? TIME OUT performed immediately prior to start of procedure:  Ele BEAN PA-C, have performed the following reviews on [place] prior to the start of the procedure:  ????????  * Patient was identified by name and date of birth   * Agreement on procedure being performed was verified  * Risks and Benefits explained to the patient  * Procedure site verified and marked as necessary  * Patient was positioned for comfort  * Consent was signed and verified    Time:9:21 AM    Body part: left hip, intra-bursal    Medication & Dose: 3ml 1% lidocaine and 6mg celestone    Date of procedure: 08/18/23    Procedure performed by: Sanya Pitts PA-C    Provider assisted by: none    Patient assisted by: self    How tolerated by patient: tolerated the procedure well with no complications    Post Procedural Pain Scale: 10    Comments:   [address] using a frequency of 10MHz with a 12L-RS transducer head was used to confirm needle placement.   Ultrasound images captured using [address] Ultrasound machine and scanned into patient's chart

## 2023-08-23 ENCOUNTER — PATIENT MESSAGE (OUTPATIENT)
Age: 68
End: 2023-08-23

## 2023-08-23 NOTE — TELEPHONE ENCOUNTER
From: Deena Adames  To: Tito Ladd  Sent: 8/23/2023 12:02 PM EDT  Subject: MRI    Was just inquiring if there was a appointment made for MRI that Tito Ladd ordered last Thursday?   Thank you

## 2023-08-28 ENCOUNTER — HOSPITAL ENCOUNTER (OUTPATIENT)
Facility: HOSPITAL | Age: 68
Discharge: HOME | End: 2023-08-31
Payer: MEDICARE

## 2023-08-28 DIAGNOSIS — M25.552 PAIN IN LEFT HIP: ICD-10-CM

## 2023-08-28 PROCEDURE — 73721 MRI JNT OF LWR EXTRE W/O DYE: CPT

## 2023-08-29 ENCOUNTER — TELEPHONE (OUTPATIENT)
Age: 68
End: 2023-08-29

## 2023-08-29 NOTE — TELEPHONE ENCOUNTER
[employer] Auth dept is requesting updated diagnosis code--needed for approval of Dexa Scan. Please let me know when ready.

## 2023-09-06 ENCOUNTER — OFFICE VISIT (OUTPATIENT)
Age: 68
End: 2023-09-06
Payer: MEDICARE

## 2023-09-06 DIAGNOSIS — Z98.890 HISTORY OF OPEN REDUCTION AND INTERNAL FIXATION (ORIF) PROCEDURE: ICD-10-CM

## 2023-09-06 DIAGNOSIS — M16.12 UNILATERAL PRIMARY OSTEOARTHRITIS, LEFT HIP: Primary | ICD-10-CM

## 2023-09-06 DIAGNOSIS — M70.62 TROCHANTERIC BURSITIS OF LEFT HIP: ICD-10-CM

## 2023-09-06 DIAGNOSIS — M24.152 DEGENERATIVE TEAR OF ACETABULAR LABRUM OF LEFT HIP: ICD-10-CM

## 2023-09-06 DIAGNOSIS — M85.852 OSTEOPENIA OF LEFT HIP: ICD-10-CM

## 2023-09-06 PROCEDURE — 99214 OFFICE O/P EST MOD 30 MIN: CPT | Performed by: PHYSICIAN ASSISTANT

## 2023-09-06 PROCEDURE — 1123F ACP DISCUSS/DSCN MKR DOCD: CPT | Performed by: PHYSICIAN ASSISTANT

## 2023-09-06 PROCEDURE — G8427 DOCREV CUR MEDS BY ELIG CLIN: HCPCS | Performed by: PHYSICIAN ASSISTANT

## 2023-09-06 PROCEDURE — G8400 PT W/DXA NO RESULTS DOC: HCPCS | Performed by: PHYSICIAN ASSISTANT

## 2023-09-06 PROCEDURE — 4004F PT TOBACCO SCREEN RCVD TLK: CPT | Performed by: PHYSICIAN ASSISTANT

## 2023-09-06 PROCEDURE — 1090F PRES/ABSN URINE INCON ASSESS: CPT | Performed by: PHYSICIAN ASSISTANT

## 2023-09-06 PROCEDURE — 3017F COLORECTAL CA SCREEN DOC REV: CPT | Performed by: PHYSICIAN ASSISTANT

## 2023-09-06 PROCEDURE — G8420 CALC BMI NORM PARAMETERS: HCPCS | Performed by: PHYSICIAN ASSISTANT

## 2023-09-06 NOTE — PROGRESS NOTES
Patient: Jose Hardy                MRN: [id number]       SSN: [id number]  YOB: 1955        AGE: 69 y.o. SEX: female  There is no height or weight on file to calculate BMI. PCP: Fermin Sheth MD  09/06/23      This office note has been dictated. REVIEW OF SYSTEMS:  Constitutional: Negative for fever, chills, weight loss and malaise/fatigue. HENT: Negative. Eyes: Negative. Respiratory: Negative. Cardiovascular: Negative. Gastrointestinal: No bowel incontinence or constipation. Genitourinary: No bladder incontinence or saddle anesthesia. Skin: Negative. Neurological: Negative. Endo/Heme/Allergies: Negative. Psychiatric/Behavioral: Negative. Musculoskeletal: As per HPI above.      Past Medical History:   Diagnosis Date    H/O seasonal allergies     Psychiatric disorder     depression, anxiety         Current Outpatient Medications:     meloxicam (MOBIC) 15 MG tablet, Take 1 tablet by mouth daily, Disp: 30 tablet, Rfl: 2    Allergies   Allergen Reactions    Aspirin      Other reaction(s): rash/itching, Red eyes    Codeine Other (See Comments)     Other reaction(s): cardiac dysrhythmia, Not available, palpatations  palpitations  Heart palpitations      Morphine Other (See Comments)     Other reaction(s): Not available, other/intolerance, other/intolerance  Low blood pressure  Lowered BP drastically         Social History     Socioeconomic History    Marital status:      Spouse name: Not on file    Number of children: Not on file    Years of education: Not on file    Highest education level: Not on file   Occupational History    Not on file   Tobacco Use    Smoking status: Every Day     Packs/day: 0.50     Types: Cigarettes    Smokeless tobacco: Never   Substance and Sexual Activity    Alcohol use: No    Drug use: No    Sexual activity: Not on file   Other Topics Concern    Not on file   Social History Narrative    Not on file     Social

## (undated) DEVICE — SUTURE VCRL SZ 7-0 L18IN ABSRB VLT L6.5MM TG140-8 3/8 CIR J546G

## (undated) DEVICE — TRAY SUPP STD NO DRUG W EXTENSION SET

## (undated) DEVICE — STERILE POLYISOPRENE POWDER-FREE SURGICAL GLOVES: Brand: PROTEXIS

## (undated) DEVICE — SOL IRR STRL H2O 500ML STRL --

## (undated) DEVICE — TRANSPORE TAPE: Brand: DEROYAL

## (undated) DEVICE — NEEDLE SPNL 22GA L3.5IN BLK HUB S STL REG WALL FIT STYL W/

## (undated) DEVICE — SUTURE PERMA-HAND SZ 0 L24IN NONABSORBABLE BLK W/O NDL SILK SA76G

## (undated) DEVICE — MIRAGE SWIFT II PILLOW LGE: Brand: MIRAGE SWIFT II

## (undated) DEVICE — LIGHT HANDLE: Brand: DEVON

## (undated) DEVICE — DRAPE TWL SURG 16X26IN BLU ORB04] ALLCARE INC]

## (undated) DEVICE — Device

## (undated) DEVICE — MASTISOL ADHESIVE LIQ 2/3ML

## (undated) DEVICE — MICROSURGICAL INSTRUMENT ANTERIOR CHAMBER CANNULA 30GA: Brand: ALCON

## (undated) DEVICE — 25 GA FINE TIP: Brand: WET-FIELD® ERASER®

## (undated) DEVICE — APPLICATOR MEDICATED 3 IN COTTON TIP WOOD SHFT

## (undated) DEVICE — SYRINGE TB 1ML NDL 25GA L0.625IN PLAS SLIP TIP CONVENTIONAL

## (undated) DEVICE — SURGICAL PROCEDURE PACK EYE 23 GA CUST

## (undated) DEVICE — SUT ETHLN 5-0 18IN RD1 DA BLK --

## (undated) DEVICE — PAD,EYE,1-5/8X2 5/8,STERILE,LF,1/PK: Brand: MEDLINE

## (undated) DEVICE — CUFF BLD PRESSURE MONITORING LNG AD 23-33 CM 1 TUBE MY CUF

## (undated) DEVICE — (D)BNDG ADHESIVE FABRIC 3/4X3 -- DISC BY MFR USE ITEM 357960

## (undated) DEVICE — MICROSURGICAL INSTRUMENT ANTERIOR CHAMBER CANNULA 27GA: Brand: ALCON

## (undated) DEVICE — FABRIC REINFORCED, SURGICAL GOWN, XL: Brand: CONVERTORS